# Patient Record
Sex: FEMALE | Race: WHITE | NOT HISPANIC OR LATINO | Employment: OTHER | URBAN - METROPOLITAN AREA
[De-identification: names, ages, dates, MRNs, and addresses within clinical notes are randomized per-mention and may not be internally consistent; named-entity substitution may affect disease eponyms.]

---

## 2018-06-29 ENCOUNTER — HOSPITAL ENCOUNTER (OUTPATIENT)
Dept: RADIOLOGY | Facility: MEDICAL CENTER | Age: 53
End: 2018-06-29

## 2018-07-05 ENCOUNTER — HOSPITAL ENCOUNTER (OUTPATIENT)
Dept: RADIOLOGY | Facility: MEDICAL CENTER | Age: 53
End: 2018-07-05
Attending: NURSE PRACTITIONER
Payer: MEDICARE

## 2018-07-05 DIAGNOSIS — Z12.39 SCREENING BREAST EXAMINATION: ICD-10-CM

## 2018-07-05 PROCEDURE — 77063 BREAST TOMOSYNTHESIS BI: CPT

## 2018-07-11 ENCOUNTER — HOSPITAL ENCOUNTER (OUTPATIENT)
Dept: LAB | Facility: MEDICAL CENTER | Age: 53
End: 2018-07-11
Attending: NURSE PRACTITIONER
Payer: MEDICARE

## 2018-07-11 LAB
ALBUMIN SERPL BCP-MCNC: 4.2 G/DL (ref 3.2–4.9)
ALBUMIN/GLOB SERPL: 2.1 G/DL
ALP SERPL-CCNC: 69 U/L (ref 30–99)
ALT SERPL-CCNC: 32 U/L (ref 2–50)
ANION GAP SERPL CALC-SCNC: 8 MMOL/L (ref 0–11.9)
APPEARANCE UR: CLEAR
AST SERPL-CCNC: 19 U/L (ref 12–45)
BACTERIA #/AREA URNS HPF: NEGATIVE /HPF
BASOPHILS # BLD AUTO: 0.9 % (ref 0–1.8)
BASOPHILS # BLD: 0.03 K/UL (ref 0–0.12)
BILIRUB SERPL-MCNC: 0.3 MG/DL (ref 0.1–1.5)
BILIRUB UR QL STRIP.AUTO: NEGATIVE
BUN SERPL-MCNC: 33 MG/DL (ref 8–22)
CALCIUM SERPL-MCNC: 9.2 MG/DL (ref 8.5–10.5)
CHLORIDE SERPL-SCNC: 110 MMOL/L (ref 96–112)
CHOLEST SERPL-MCNC: 190 MG/DL (ref 100–199)
CO2 SERPL-SCNC: 27 MMOL/L (ref 20–33)
COLOR UR: YELLOW
CREAT SERPL-MCNC: 1.15 MG/DL (ref 0.5–1.4)
EOSINOPHIL # BLD AUTO: 0.08 K/UL (ref 0–0.51)
EOSINOPHIL NFR BLD: 2.5 % (ref 0–6.9)
EPI CELLS #/AREA URNS HPF: ABNORMAL /HPF
ERYTHROCYTE [DISTWIDTH] IN BLOOD BY AUTOMATED COUNT: 40 FL (ref 35.9–50)
GLOBULIN SER CALC-MCNC: 2 G/DL (ref 1.9–3.5)
GLUCOSE SERPL-MCNC: 104 MG/DL (ref 65–99)
GLUCOSE UR STRIP.AUTO-MCNC: NEGATIVE MG/DL
HCT VFR BLD AUTO: 42.8 % (ref 37–47)
HDLC SERPL-MCNC: 61 MG/DL
HGB BLD-MCNC: 14.5 G/DL (ref 12–16)
HYALINE CASTS #/AREA URNS LPF: ABNORMAL /LPF
IMM GRANULOCYTES # BLD AUTO: 0 K/UL (ref 0–0.11)
IMM GRANULOCYTES NFR BLD AUTO: 0 % (ref 0–0.9)
KETONES UR STRIP.AUTO-MCNC: NEGATIVE MG/DL
LDLC SERPL CALC-MCNC: 116 MG/DL
LEUKOCYTE ESTERASE UR QL STRIP.AUTO: ABNORMAL
LYMPHOCYTES # BLD AUTO: 1.74 K/UL (ref 1–4.8)
LYMPHOCYTES NFR BLD: 55.1 % (ref 22–41)
MCH RBC QN AUTO: 32.5 PG (ref 27–33)
MCHC RBC AUTO-ENTMCNC: 33.9 G/DL (ref 33.6–35)
MCV RBC AUTO: 96 FL (ref 81.4–97.8)
MICRO URNS: ABNORMAL
MONOCYTES # BLD AUTO: 0.21 K/UL (ref 0–0.85)
MONOCYTES NFR BLD AUTO: 6.6 % (ref 0–13.4)
NEUTROPHILS # BLD AUTO: 1.1 K/UL (ref 2–7.15)
NEUTROPHILS NFR BLD: 34.9 % (ref 44–72)
NITRITE UR QL STRIP.AUTO: NEGATIVE
NRBC # BLD AUTO: 0 K/UL
NRBC BLD-RTO: 0 /100 WBC
PH UR STRIP.AUTO: 6.5 [PH]
PLATELET # BLD AUTO: 181 K/UL (ref 164–446)
PMV BLD AUTO: 9.5 FL (ref 9–12.9)
POTASSIUM SERPL-SCNC: 4.4 MMOL/L (ref 3.6–5.5)
PROT SERPL-MCNC: 6.2 G/DL (ref 6–8.2)
PROT UR QL STRIP: NEGATIVE MG/DL
RBC # BLD AUTO: 4.46 M/UL (ref 4.2–5.4)
RBC # URNS HPF: ABNORMAL /HPF
RBC UR QL AUTO: NEGATIVE
SODIUM SERPL-SCNC: 145 MMOL/L (ref 135–145)
SP GR UR STRIP.AUTO: 1.02
T4 FREE SERPL-MCNC: 0.84 NG/DL (ref 0.53–1.43)
TRIGL SERPL-MCNC: 64 MG/DL (ref 0–149)
TSH SERPL DL<=0.005 MIU/L-ACNC: 10.51 UIU/ML (ref 0.38–5.33)
UROBILINOGEN UR STRIP.AUTO-MCNC: 1 MG/DL
WBC # BLD AUTO: 3.2 K/UL (ref 4.8–10.8)
WBC #/AREA URNS HPF: ABNORMAL /HPF

## 2018-07-11 PROCEDURE — 80061 LIPID PANEL: CPT

## 2018-07-11 PROCEDURE — 87086 URINE CULTURE/COLONY COUNT: CPT

## 2018-07-11 PROCEDURE — 36415 COLL VENOUS BLD VENIPUNCTURE: CPT

## 2018-07-11 PROCEDURE — 85025 COMPLETE CBC W/AUTO DIFF WBC: CPT

## 2018-07-11 PROCEDURE — 84439 ASSAY OF FREE THYROXINE: CPT

## 2018-07-11 PROCEDURE — 80053 COMPREHEN METABOLIC PANEL: CPT

## 2018-07-11 PROCEDURE — 81001 URINALYSIS AUTO W/SCOPE: CPT

## 2018-07-11 PROCEDURE — 84443 ASSAY THYROID STIM HORMONE: CPT

## 2018-07-13 LAB
BACTERIA UR CULT: NORMAL
SIGNIFICANT IND 70042: NORMAL
SITE SITE: NORMAL
SOURCE SOURCE: NORMAL

## 2018-10-10 ENCOUNTER — HOSPITAL ENCOUNTER (OUTPATIENT)
Dept: LAB | Facility: MEDICAL CENTER | Age: 53
End: 2018-10-10
Attending: NURSE PRACTITIONER
Payer: MEDICARE

## 2018-10-10 LAB
T4 FREE SERPL-MCNC: 0.89 NG/DL (ref 0.53–1.43)
THYROPEROXIDASE AB SERPL-ACNC: 0.5 IU/ML (ref 0–9)
TSH SERPL DL<=0.005 MIU/L-ACNC: 3.69 UIU/ML (ref 0.38–5.33)

## 2018-10-10 PROCEDURE — 86376 MICROSOMAL ANTIBODY EACH: CPT

## 2018-10-10 PROCEDURE — 36415 COLL VENOUS BLD VENIPUNCTURE: CPT

## 2018-10-10 PROCEDURE — 84439 ASSAY OF FREE THYROXINE: CPT

## 2018-10-10 PROCEDURE — 84443 ASSAY THYROID STIM HORMONE: CPT

## 2019-06-15 ENCOUNTER — HOSPITAL ENCOUNTER (EMERGENCY)
Facility: MEDICAL CENTER | Age: 54
End: 2019-06-15
Attending: EMERGENCY MEDICINE
Payer: MEDICARE

## 2019-06-15 ENCOUNTER — APPOINTMENT (OUTPATIENT)
Dept: RADIOLOGY | Facility: MEDICAL CENTER | Age: 54
End: 2019-06-15
Attending: EMERGENCY MEDICINE
Payer: MEDICARE

## 2019-06-15 VITALS
WEIGHT: 134.7 LBS | HEIGHT: 62 IN | RESPIRATION RATE: 16 BRPM | BODY MASS INDEX: 24.79 KG/M2 | TEMPERATURE: 97.1 F | SYSTOLIC BLOOD PRESSURE: 137 MMHG | HEART RATE: 82 BPM | OXYGEN SATURATION: 95 % | DIASTOLIC BLOOD PRESSURE: 93 MMHG

## 2019-06-15 DIAGNOSIS — N20.1 URETEROLITHIASIS: ICD-10-CM

## 2019-06-15 DIAGNOSIS — R79.89 ELEVATED SERUM CREATININE: ICD-10-CM

## 2019-06-15 LAB
ALBUMIN SERPL BCP-MCNC: 4.3 G/DL (ref 3.2–4.9)
ALBUMIN/GLOB SERPL: 1.5 G/DL
ALP SERPL-CCNC: 64 U/L (ref 30–99)
ALT SERPL-CCNC: 9 U/L (ref 2–50)
ANION GAP SERPL CALC-SCNC: 9 MMOL/L (ref 0–11.9)
APPEARANCE UR: CLEAR
AST SERPL-CCNC: 12 U/L (ref 12–45)
BASOPHILS # BLD AUTO: 0.6 % (ref 0–1.8)
BASOPHILS # BLD: 0.04 K/UL (ref 0–0.12)
BILIRUB SERPL-MCNC: 0.5 MG/DL (ref 0.1–1.5)
BILIRUB UR QL STRIP.AUTO: NEGATIVE
BUN SERPL-MCNC: 27 MG/DL (ref 8–22)
CALCIUM SERPL-MCNC: 9.5 MG/DL (ref 8.5–10.5)
CHLORIDE SERPL-SCNC: 106 MMOL/L (ref 96–112)
CO2 SERPL-SCNC: 28 MMOL/L (ref 20–33)
COLOR UR: YELLOW
CREAT SERPL-MCNC: 1.28 MG/DL (ref 0.5–1.4)
EOSINOPHIL # BLD AUTO: 0.05 K/UL (ref 0–0.51)
EOSINOPHIL NFR BLD: 0.8 % (ref 0–6.9)
ERYTHROCYTE [DISTWIDTH] IN BLOOD BY AUTOMATED COUNT: 38.2 FL (ref 35.9–50)
GLOBULIN SER CALC-MCNC: 2.9 G/DL (ref 1.9–3.5)
GLUCOSE SERPL-MCNC: 132 MG/DL (ref 65–99)
GLUCOSE UR STRIP.AUTO-MCNC: NEGATIVE MG/DL
HCG SERPL QL: NEGATIVE
HCT VFR BLD AUTO: 48 % (ref 37–47)
HGB BLD-MCNC: 15.7 G/DL (ref 12–16)
IMM GRANULOCYTES # BLD AUTO: 0.02 K/UL (ref 0–0.11)
IMM GRANULOCYTES NFR BLD AUTO: 0.3 % (ref 0–0.9)
KETONES UR STRIP.AUTO-MCNC: ABNORMAL MG/DL
LACTATE BLD-SCNC: 1 MMOL/L (ref 0.5–2)
LEUKOCYTE ESTERASE UR QL STRIP.AUTO: NEGATIVE
LIPASE SERPL-CCNC: <3 U/L (ref 11–82)
LYMPHOCYTES # BLD AUTO: 1.48 K/UL (ref 1–4.8)
LYMPHOCYTES NFR BLD: 22.9 % (ref 22–41)
MCH RBC QN AUTO: 30.5 PG (ref 27–33)
MCHC RBC AUTO-ENTMCNC: 32.7 G/DL (ref 33.6–35)
MCV RBC AUTO: 93.2 FL (ref 81.4–97.8)
MICRO URNS: ABNORMAL
MONOCYTES # BLD AUTO: 0.32 K/UL (ref 0–0.85)
MONOCYTES NFR BLD AUTO: 4.9 % (ref 0–13.4)
NEUTROPHILS # BLD AUTO: 4.56 K/UL (ref 2–7.15)
NEUTROPHILS NFR BLD: 70.5 % (ref 44–72)
NITRITE UR QL STRIP.AUTO: NEGATIVE
NRBC # BLD AUTO: 0 K/UL
NRBC BLD-RTO: 0 /100 WBC
PH UR STRIP.AUTO: 5 [PH]
PLATELET # BLD AUTO: 215 K/UL (ref 164–446)
PMV BLD AUTO: 8.8 FL (ref 9–12.9)
POTASSIUM SERPL-SCNC: 4.2 MMOL/L (ref 3.6–5.5)
PROT SERPL-MCNC: 7.2 G/DL (ref 6–8.2)
PROT UR QL STRIP: NEGATIVE MG/DL
RBC # BLD AUTO: 5.15 M/UL (ref 4.2–5.4)
RBC UR QL AUTO: NEGATIVE
SODIUM SERPL-SCNC: 143 MMOL/L (ref 135–145)
SP GR UR STRIP.AUTO: 1.03
UROBILINOGEN UR STRIP.AUTO-MCNC: 1 MG/DL
WBC # BLD AUTO: 6.5 K/UL (ref 4.8–10.8)

## 2019-06-15 PROCEDURE — 81003 URINALYSIS AUTO W/O SCOPE: CPT

## 2019-06-15 PROCEDURE — 99284 EMERGENCY DEPT VISIT MOD MDM: CPT

## 2019-06-15 PROCEDURE — 83690 ASSAY OF LIPASE: CPT

## 2019-06-15 PROCEDURE — 80053 COMPREHEN METABOLIC PANEL: CPT

## 2019-06-15 PROCEDURE — 93005 ELECTROCARDIOGRAM TRACING: CPT | Performed by: EMERGENCY MEDICINE

## 2019-06-15 PROCEDURE — 84703 CHORIONIC GONADOTROPIN ASSAY: CPT

## 2019-06-15 PROCEDURE — 36415 COLL VENOUS BLD VENIPUNCTURE: CPT

## 2019-06-15 PROCEDURE — 96374 THER/PROPH/DIAG INJ IV PUSH: CPT

## 2019-06-15 PROCEDURE — 700117 HCHG RX CONTRAST REV CODE 255: Performed by: EMERGENCY MEDICINE

## 2019-06-15 PROCEDURE — 74177 CT ABD & PELVIS W/CONTRAST: CPT

## 2019-06-15 PROCEDURE — 83605 ASSAY OF LACTIC ACID: CPT

## 2019-06-15 PROCEDURE — 85025 COMPLETE CBC W/AUTO DIFF WBC: CPT

## 2019-06-15 PROCEDURE — 700111 HCHG RX REV CODE 636 W/ 250 OVERRIDE (IP): Performed by: EMERGENCY MEDICINE

## 2019-06-15 RX ORDER — HYDROMORPHONE HYDROCHLORIDE 1 MG/ML
1 INJECTION, SOLUTION INTRAMUSCULAR; INTRAVENOUS; SUBCUTANEOUS ONCE
Status: COMPLETED | OUTPATIENT
Start: 2019-06-15 | End: 2019-06-15

## 2019-06-15 RX ADMIN — IOHEXOL 90 ML: 350 INJECTION, SOLUTION INTRAVENOUS at 19:43

## 2019-06-15 RX ADMIN — IOHEXOL 25 ML: 240 INJECTION, SOLUTION INTRATHECAL; INTRAVASCULAR; INTRAVENOUS; ORAL at 19:43

## 2019-06-15 RX ADMIN — HYDROMORPHONE HYDROCHLORIDE 1 MG: 1 INJECTION, SOLUTION INTRAMUSCULAR; INTRAVENOUS; SUBCUTANEOUS at 17:45

## 2019-06-15 ASSESSMENT — LIFESTYLE VARIABLES: DO YOU DRINK ALCOHOL: NO

## 2019-06-15 NOTE — ED TRIAGE NOTES
Ambulates to triage  Chief Complaint   Patient presents with   • RLQ Pain     Pain came on suddenly about 1 hour ago, pain is constant, it started after drinking and iced tea.  Denies any n/v/d.

## 2019-06-16 LAB — EKG IMPRESSION: NORMAL

## 2019-06-16 NOTE — ED PROVIDER NOTES
ED Provider Note    Scribed for Denny Bruno M.D. by Camila Ma. 6/15/2019  5:20 PM    Primary care provider: Rhonda Ramirez  Means of arrival: Walk-In  History obtained from: Patient  History limited by: None    CHIEF COMPLAINT  Chief Complaint   Patient presents with   • RLQ Pain       HPI  Jemima Piña is a 53 y.o. female who presents to the Emergency Department for mild burning/aching right lower quadrant abdominal pain sudden onset at 1:30PM today. The pain radiates to her right lower back. She denies hematuria, nausea, vomiting, or diarrhea. She has never experienced this pain before. She has an abdominal surgical history of cholecystectomy.     REVIEW OF SYSTEMS  Pertinent positives include: right lower quadrant abdominal pain radiating to her right lower back. Pertinent negatives include: hematuria, nausea, vomiting, or diarrhea. See history of present illness. All other systems are negative.     PAST MEDICAL HISTORY   has a past medical history of Anxiety state, unspecified; Bipolar I disorder, most recent episode (or current) depressed, unspecified; Breast cancer (HCC) (2010); Chronic fatigue syndrome; CKD (chronic kidney disease) stage 2, GFR 60-89 ml/min; Congenital hypertrophic pyloric stenosis; Depression; Edema; Endometriosis, site unspecified; Hemorrhage of rectum and anus; Myalgia and myositis, unspecified; Other and unspecified ovarian cyst; Peptic ulcer, unspecified site, unspecified as acute or chronic, without mention of hemorrhage, perforation, or obstruction; Temporomandibular joint disorders, unspecified; Tobacco use disorder; Unspecified hypothyroidism; and Vaginal candidiasis (10/9/12).    SURGICAL HISTORY   has a past surgical history that includes tonsillectomy (1981); cholecystectomy (1991); thyroid lobectomy (1999); arthroscopy, knee; tubal coagulation laparoscopic bilateral (1994); and lumpectomy.    SOCIAL HISTORY  Social History   Substance Use Topics   • Smoking status:  "Former Smoker     Types: Cigarettes     Quit date: 11/1/2016   • Smokeless tobacco: Never Used      Comment: expect with her emotional disease that she cannot quit at this time   • Alcohol use No      History   Drug Use No       FAMILY HISTORY  Family History   Problem Relation Age of Onset   • Other Mother         bipolar, constipation   • Psychiatry Mother    • Clotting Disorder Father         had blood clot remotely   • Other Sister         suicide   • Psychiatry Sister    • Cancer Maternal Grandfather         prostate   • Cancer Paternal Grandfather         prostate   • Asthma Child    • Lung Disease Child        CURRENT MEDICATIONS  Current Outpatient Prescriptions:   •  Levomilnacipran HCl ER (FETZIMA) 80 MG CP24, Take 80 mg by mouth every day., Disp: 30 Cap, Rfl: 0  •  gabapentin (NEURONTIN) 300 MG CAPS, Take three tablets orally daily., Disp: 180 Cap, Rfl: 0  •  carisoprodol (SOMA) 350 MG TABS, TAKE ONE TABLET BY MOUTH TWICE DAILY, Disp: 60 Tab, Rfl: 5  •  eletriptan (RELPAX) 40 MG tablet, Take 1 Tab by mouth Once PRN. may repeat in 2 hours if necessary for migraine., Disp: 10 Tab, Rfl: 3  •  FLECTOR 1.3 % PTCH, APPLY 1 PATCH TO SKIN AS DIRECTED 2 TIMES A DAY, Disp: 60 Patch, Rfl: 0  •  B-D INS SYRINGE 0.5CC/30GX1/2\" 30G X 1/2\" 0.5 ML MISC, USE AS DIRECTED, Disp: 100 Each, Rfl: 11  •  Asenapine Maleate (SAPHRIS) 10 MG SUBL, Place 10 Tabs under tongue 2 Times a Day., Disp: 60 Each, Rfl: 2  •  zolpidem (AMBIEN) 10 MG TABS, Take 2 Tabs by mouth at bedtime as needed for Sleep., Disp: 60 Each, Rfl: 2  •  chlorproMAZINE (THORAZINE) 100 MG tablet, Take 2 Tabs by mouth every bedtime., Disp: 60 Tab, Rfl: 2  •  clonazepam (KLONOPIN) 1 MG TABS, Take 0.5 Tabs by mouth 2 times a day., Disp: 60 Tab, Rfl: 2  •  furosemide (LASIX) 20 MG TABS, Take 1 Tab by mouth every 48 hours as needed., Disp: 10 Tab, Rfl: 5  •  thyroid (ARMOUR THYROID) 30 MG TABS, Take 1 Tab by mouth every day., Disp: , Rfl:   •  cyanocobalamin (VITAMIN " "B-12) 1000 MCG/ML SOLN, INJECT 1ML IM ONCE A WEEK, Disp: 6 mL, Rfl: 5  •  promethazine (PHENERGAN) 50 MG TABS, TAKE 1 TABLET BY MOUTH EVERY 6 HOURS AS NEEDED FOR NAUSEA/VOMITING., Disp: 30 Tab, Rfl: 4  •  tamoxifen (NOLVADEX) 20 MG tablet, Take 20 mg by mouth every day., Disp: , Rfl:   •  ranitidine (ZANTAC) 150 MG TABS, Take 150 mg by mouth See Admin Instructions. 1 tab daily prn stomach upset. , Disp: , Rfl:     ALLERGIES  Allergies   Allergen Reactions   • Aminophylline      Original entry read AMNOPHYLIN      • Asa [Aspirin]    • Cyclacillin      Original entry read cyclines   • Flu Virus Vaccine    • Pcn [Penicillins]    • Reglan [Metoclopramide Hcl]    • Trazodone      Increased anxiety       PHYSICAL EXAM  VITAL SIGNS: /93   Pulse 88   Temp 36.2 °C (97.1 °F) (Temporal)   Resp 16   Ht 1.575 m (5' 2\")   Wt 61.1 kg (134 lb 11.2 oz)   SpO2 92%   BMI 24.64 kg/m²     Constitutional: Alert in no apparent distress.  HENT: No signs of trauma, Bilateral external ears normal, Nose normal. Uvula midline.   Eyes: Pupils are equal and reactive, Conjunctiva normal, Non-icteric.   Neck: Normal range of motion, No tenderness, Supple, No stridor.   Lymphatic: No lymphadenopathy noted.   Cardiovascular: Regular rate and rhythm, no murmurs.   Thorax & Lungs: Normal breath sounds, No respiratory distress, No wheezing, No chest tenderness.   Abdomen:   Soft, No tenderness, No peritoneal signs, No masses, No pulsatile masses.   Skin: Warm, Dry, No erythema, No rash.   Back: No bony tenderness, No CVA tenderness.   Extremities: Intact distal pulses, No edema, No tenderness, No cyanosis.  Musculoskeletal: Good range of motion in all major joints. No tenderness to palpation or major deformities noted.   Neurologic: Alert , Normal motor function, Normal sensory function, No focal deficits noted.   Psychiatric: Affect normal, Judgment normal, Mood normal.       DIAGNOSTIC STUDIES / PROCEDURES    LABS  Labs Reviewed   CBC " WITH DIFFERENTIAL - Abnormal; Notable for the following:        Result Value    Hematocrit 48.0 (*)     MCHC 32.7 (*)     MPV 8.8 (*)     All other components within normal limits   COMP METABOLIC PANEL - Abnormal; Notable for the following:     Glucose 132 (*)     Bun 27 (*)     All other components within normal limits   LIPASE - Abnormal; Notable for the following:     Lipase <3 (*)     All other components within normal limits   URINALYSIS,CULTURE IF INDICATED - Abnormal; Notable for the following:     Ketones Trace (*)     All other components within normal limits   ESTIMATED GFR - Abnormal; Notable for the following:     GFR If  53 (*)     GFR If Non  44 (*)     All other components within normal limits   HCG QUAL SERUM   LACTIC ACID      All labs reviewed by me.      RADIOLOGY  CT-ABDOMEN-PELVIS WITH   Final Result      Mild right hydroureteronephrosis. The distal right ureter is not well delineated but there is a 2.8 mm calcific density in the right pelvis which might be at the right UVJ.      Perinephric stranding is seen. Correlation with urinalysis is recommended to exclude superimposed infection.      Interna and extrahepatic biliary ductal prominence may represent ectasia in the setting of prior cholecystectomy. Correlation with LFTs is recommended.      Moderate amount of colonic stool.      No evidence of acute appendicitis.           The radiologist's interpretation of all radiological studies have been reviewed by me.    COURSE & MEDICAL DECISION MAKING  Nursing notes, VS, PMSFHx reviewed in chart.    53 y.o. female p/w chief complaint of right lower quadrant abdominal pain.    The differential diagnoses include but are not limited to:     #abdominal pain    No LLQ pain or TTP or fever to suggest diverticulitis  No pain at of proportion to suggest mesenteric ischemia  No e/o rash or zoster      CT scan of abdomen ordered in order to rule out appy vs stone    No e/o  UTI  No upper abd pain or elevation in lipase to suggest pancreatitis    Patient's history and physical exam consistent with ureterolithiasis.  No obvious infected stone.  I discussed results with patient and she agrees to follow-up with urologist and primary care physician within the next week.    5:20 PM Patient seen and examined at bedside. Patient will be treated with Dilaudid 1 mg for her pain. Ordered CT-Abdomen, EKG, Beta-HCG Qual, Lactic Acid, Estimated GFR, CBC w/ differential, CMP, Lipase, UA with culture to evaluate her symptoms.     7:42 PM - I have ordered Omnipaque 350 mg/mL and 240 mg/mL.    8:25 PM - I reevaluated the patient at bedside and updated her on her evaluation results, as outlined above. I discussed my plan for discharge with urology follow up. The patient is understanding and agreeable.   The patient will return for new or worsening symptoms and is stable at the time of discharge.    The patient is referred to a primary physician for blood pressure management, diabetic screening, and for all other preventative health concerns.    DISPOSITION:  Patient will be discharged home in stable condition.    FOLLOW UP:  Rhonda Ramirez  [address] [address]  [phone]      Please call and schedule follow-up appointment for repeat blood tests within the next week to have your kidney function rechecked and discuss your kidney stone.    Candido Patterson M.D.  [address] [facility] [address]  [phone]    In 3 days  Please call to schedule urology follow-up appointment later this week.      FINAL IMPRESSION  1. Ureterolithiasis    2. Elevated serum creatinine          Camila MOONEY ([place]), am scribing for, and in the presence of, Denny Bruno M.D..    Electronically signed by: Camila Ma ([place]), 6/15/2019    IDenny M.D. personally performed the services described in this documentation, as scribed by Camila Ma in my presence, and it is both accurate  and complete.    The note accurately reflects work and decisions made by me.  Denny Bruno  6/16/2019  12:51 AM

## 2019-06-16 NOTE — ED NOTES
Assumed care of pt, Pt resting on cart, no s/s of distress. Vitals stable, call light within reach, cart low and locked. Pt finished with oral contrast, awaiting CT

## 2019-06-16 NOTE — DISCHARGE INSTRUCTIONS
Please discuss your CT scan findings with your urologist and regular doctor:  Mild right hydroureteronephrosis. The distal right ureter is not well delineated but there is a 2.8 mm calcific density in the right pelvis which might be at the right UVJ.    Perinephric stranding is seen. Correlation with urinalysis is recommended to exclude superimposed infection.    Interna and extrahepatic biliary ductal prominence may represent ectasia in the setting of prior cholecystectomy. Correlation with LFTs is recommended.    Moderate amount of colonic stool.    No evidence of acute appendicitis.

## 2019-06-16 NOTE — ED NOTES
All lines, monitors DC'd. Discharge instructions given. Pt given time to ask any questions. Pt ambulatory out of department. All pt belongings in pt's possession. Pt verbalized understanding.

## 2019-07-09 ENCOUNTER — HOSPITAL ENCOUNTER (OUTPATIENT)
Dept: RADIOLOGY | Facility: MEDICAL CENTER | Age: 54
End: 2019-07-09
Attending: NURSE PRACTITIONER
Payer: MEDICARE

## 2019-07-09 DIAGNOSIS — Z12.39 SCREENING BREAST EXAMINATION: ICD-10-CM

## 2019-07-09 PROCEDURE — 77063 BREAST TOMOSYNTHESIS BI: CPT

## 2019-07-24 ENCOUNTER — HOSPITAL ENCOUNTER (OUTPATIENT)
Dept: LAB | Facility: MEDICAL CENTER | Age: 54
End: 2019-07-24
Attending: NURSE PRACTITIONER
Payer: MEDICARE

## 2019-07-24 LAB
ALBUMIN SERPL BCP-MCNC: 4 G/DL (ref 3.2–4.9)
ALBUMIN/GLOB SERPL: 1.4 G/DL
ALP SERPL-CCNC: 56 U/L (ref 30–99)
ALT SERPL-CCNC: 10 U/L (ref 2–50)
ANION GAP SERPL CALC-SCNC: 8 MMOL/L (ref 0–11.9)
APPEARANCE UR: CLEAR
AST SERPL-CCNC: 15 U/L (ref 12–45)
BASOPHILS # BLD AUTO: 0.3 % (ref 0–1.8)
BASOPHILS # BLD: 0.01 K/UL (ref 0–0.12)
BILIRUB SERPL-MCNC: 0.4 MG/DL (ref 0.1–1.5)
BILIRUB UR QL STRIP.AUTO: NEGATIVE
BUN SERPL-MCNC: 20 MG/DL (ref 8–22)
CALCIUM SERPL-MCNC: 9.5 MG/DL (ref 8.5–10.5)
CHLORIDE SERPL-SCNC: 107 MMOL/L (ref 96–112)
CHOLEST SERPL-MCNC: 219 MG/DL (ref 100–199)
CO2 SERPL-SCNC: 28 MMOL/L (ref 20–33)
COLOR UR: YELLOW
CREAT SERPL-MCNC: 1.25 MG/DL (ref 0.5–1.4)
EOSINOPHIL # BLD AUTO: 0.08 K/UL (ref 0–0.51)
EOSINOPHIL NFR BLD: 2.2 % (ref 0–6.9)
ERYTHROCYTE [DISTWIDTH] IN BLOOD BY AUTOMATED COUNT: 39.7 FL (ref 35.9–50)
GLOBULIN SER CALC-MCNC: 2.9 G/DL (ref 1.9–3.5)
GLUCOSE SERPL-MCNC: 118 MG/DL (ref 65–99)
GLUCOSE UR STRIP.AUTO-MCNC: NEGATIVE MG/DL
HCT VFR BLD AUTO: 47.2 % (ref 37–47)
HDLC SERPL-MCNC: 55 MG/DL
HGB BLD-MCNC: 16 G/DL (ref 12–16)
IMM GRANULOCYTES # BLD AUTO: 0 K/UL (ref 0–0.11)
IMM GRANULOCYTES NFR BLD AUTO: 0 % (ref 0–0.9)
KETONES UR STRIP.AUTO-MCNC: NEGATIVE MG/DL
LDLC SERPL CALC-MCNC: 143 MG/DL
LEUKOCYTE ESTERASE UR QL STRIP.AUTO: NEGATIVE
LYMPHOCYTES # BLD AUTO: 1.57 K/UL (ref 1–4.8)
LYMPHOCYTES NFR BLD: 42.4 % (ref 22–41)
MCH RBC QN AUTO: 31.7 PG (ref 27–33)
MCHC RBC AUTO-ENTMCNC: 33.9 G/DL (ref 33.6–35)
MCV RBC AUTO: 93.5 FL (ref 81.4–97.8)
MICRO URNS: NORMAL
MONOCYTES # BLD AUTO: 0.24 K/UL (ref 0–0.85)
MONOCYTES NFR BLD AUTO: 6.5 % (ref 0–13.4)
NEUTROPHILS # BLD AUTO: 1.8 K/UL (ref 2–7.15)
NEUTROPHILS NFR BLD: 48.6 % (ref 44–72)
NITRITE UR QL STRIP.AUTO: NEGATIVE
NRBC # BLD AUTO: 0 K/UL
NRBC BLD-RTO: 0 /100 WBC
PH UR STRIP.AUTO: 7.5 [PH]
PLATELET # BLD AUTO: 196 K/UL (ref 164–446)
PMV BLD AUTO: 9.6 FL (ref 9–12.9)
POTASSIUM SERPL-SCNC: 4.1 MMOL/L (ref 3.6–5.5)
PROT SERPL-MCNC: 6.9 G/DL (ref 6–8.2)
PROT UR QL STRIP: NEGATIVE MG/DL
RBC # BLD AUTO: 5.05 M/UL (ref 4.2–5.4)
RBC UR QL AUTO: NEGATIVE
SODIUM SERPL-SCNC: 143 MMOL/L (ref 135–145)
SP GR UR STRIP.AUTO: 1.01
T4 FREE SERPL-MCNC: 0.76 NG/DL (ref 0.53–1.43)
TRIGL SERPL-MCNC: 106 MG/DL (ref 0–149)
TSH SERPL DL<=0.005 MIU/L-ACNC: 2.67 UIU/ML (ref 0.38–5.33)
UROBILINOGEN UR STRIP.AUTO-MCNC: 1 MG/DL
WBC # BLD AUTO: 3.7 K/UL (ref 4.8–10.8)

## 2019-07-24 PROCEDURE — 81003 URINALYSIS AUTO W/O SCOPE: CPT

## 2019-07-24 PROCEDURE — 80053 COMPREHEN METABOLIC PANEL: CPT

## 2019-07-24 PROCEDURE — 36415 COLL VENOUS BLD VENIPUNCTURE: CPT

## 2019-07-24 PROCEDURE — 85025 COMPLETE CBC W/AUTO DIFF WBC: CPT

## 2019-07-24 PROCEDURE — 84439 ASSAY OF FREE THYROXINE: CPT

## 2019-07-24 PROCEDURE — 84443 ASSAY THYROID STIM HORMONE: CPT

## 2019-07-24 PROCEDURE — 80061 LIPID PANEL: CPT

## 2019-11-24 ENCOUNTER — HOSPITAL ENCOUNTER (OUTPATIENT)
Facility: MEDICAL CENTER | Age: 54
End: 2019-11-24
Payer: MEDICARE

## 2019-11-24 PROCEDURE — 82274 ASSAY TEST FOR BLOOD FECAL: CPT

## 2019-11-27 ENCOUNTER — HOSPITAL ENCOUNTER (OUTPATIENT)
Facility: MEDICAL CENTER | Age: 54
End: 2019-11-27
Payer: MEDICARE

## 2019-12-05 LAB — HEMOCCULT STL QL IA: NEGATIVE

## 2020-01-17 ENCOUNTER — HOSPITAL ENCOUNTER (OUTPATIENT)
Dept: LAB | Facility: MEDICAL CENTER | Age: 55
End: 2020-01-17
Attending: NURSE PRACTITIONER
Payer: MEDICARE

## 2020-01-17 LAB
ALBUMIN SERPL BCP-MCNC: 3.8 G/DL (ref 3.2–4.9)
ALBUMIN/GLOB SERPL: 1.5 G/DL
ALP SERPL-CCNC: 79 U/L (ref 30–99)
ALT SERPL-CCNC: 14 U/L (ref 2–50)
ANION GAP SERPL CALC-SCNC: 7 MMOL/L (ref 0–11.9)
AST SERPL-CCNC: 16 U/L (ref 12–45)
BASOPHILS # BLD AUTO: 0.6 % (ref 0–1.8)
BASOPHILS # BLD: 0.02 K/UL (ref 0–0.12)
BILIRUB SERPL-MCNC: 0.4 MG/DL (ref 0.1–1.5)
BUN SERPL-MCNC: 25 MG/DL (ref 8–22)
CALCIUM SERPL-MCNC: 9.4 MG/DL (ref 8.5–10.5)
CHLORIDE SERPL-SCNC: 106 MMOL/L (ref 96–112)
CHOLEST SERPL-MCNC: 185 MG/DL (ref 100–199)
CO2 SERPL-SCNC: 30 MMOL/L (ref 20–33)
CREAT SERPL-MCNC: 1 MG/DL (ref 0.5–1.4)
EOSINOPHIL # BLD AUTO: 0.06 K/UL (ref 0–0.51)
EOSINOPHIL NFR BLD: 1.8 % (ref 0–6.9)
ERYTHROCYTE [DISTWIDTH] IN BLOOD BY AUTOMATED COUNT: 37.2 FL (ref 35.9–50)
EST. AVERAGE GLUCOSE BLD GHB EST-MCNC: 105 MG/DL
FASTING STATUS PATIENT QL REPORTED: NORMAL
GLOBULIN SER CALC-MCNC: 2.6 G/DL (ref 1.9–3.5)
GLUCOSE SERPL-MCNC: 93 MG/DL (ref 65–99)
HBA1C MFR BLD: 5.3 % (ref 0–5.6)
HCT VFR BLD AUTO: 45.6 % (ref 37–47)
HDLC SERPL-MCNC: 55 MG/DL
HGB BLD-MCNC: 15.5 G/DL (ref 12–16)
IMM GRANULOCYTES # BLD AUTO: 0.01 K/UL (ref 0–0.11)
IMM GRANULOCYTES NFR BLD AUTO: 0.3 % (ref 0–0.9)
LDLC SERPL CALC-MCNC: 110 MG/DL
LYMPHOCYTES # BLD AUTO: 1.75 K/UL (ref 1–4.8)
LYMPHOCYTES NFR BLD: 53.5 % (ref 22–41)
MCH RBC QN AUTO: 30.7 PG (ref 27–33)
MCHC RBC AUTO-ENTMCNC: 34 G/DL (ref 33.6–35)
MCV RBC AUTO: 90.3 FL (ref 81.4–97.8)
MONOCYTES # BLD AUTO: 0.25 K/UL (ref 0–0.85)
MONOCYTES NFR BLD AUTO: 7.6 % (ref 0–13.4)
NEUTROPHILS # BLD AUTO: 1.18 K/UL (ref 2–7.15)
NEUTROPHILS NFR BLD: 36.2 % (ref 44–72)
NRBC # BLD AUTO: 0 K/UL
NRBC BLD-RTO: 0 /100 WBC
PLATELET # BLD AUTO: 188 K/UL (ref 164–446)
PMV BLD AUTO: 9.1 FL (ref 9–12.9)
POTASSIUM SERPL-SCNC: 4.3 MMOL/L (ref 3.6–5.5)
PROT SERPL-MCNC: 6.4 G/DL (ref 6–8.2)
RBC # BLD AUTO: 5.05 M/UL (ref 4.2–5.4)
SODIUM SERPL-SCNC: 143 MMOL/L (ref 135–145)
TRIGL SERPL-MCNC: 102 MG/DL (ref 0–149)
TSH SERPL DL<=0.005 MIU/L-ACNC: 0.02 UIU/ML (ref 0.38–5.33)
WBC # BLD AUTO: 3.3 K/UL (ref 4.8–10.8)

## 2020-01-17 PROCEDURE — 85025 COMPLETE CBC W/AUTO DIFF WBC: CPT

## 2020-01-17 PROCEDURE — 80053 COMPREHEN METABOLIC PANEL: CPT

## 2020-01-17 PROCEDURE — 84443 ASSAY THYROID STIM HORMONE: CPT

## 2020-01-17 PROCEDURE — 36415 COLL VENOUS BLD VENIPUNCTURE: CPT

## 2020-01-17 PROCEDURE — 83036 HEMOGLOBIN GLYCOSYLATED A1C: CPT

## 2020-01-17 PROCEDURE — 80061 LIPID PANEL: CPT

## 2020-03-19 ENCOUNTER — HOSPITAL ENCOUNTER (OUTPATIENT)
Dept: LAB | Facility: MEDICAL CENTER | Age: 55
End: 2020-03-19
Attending: NURSE PRACTITIONER
Payer: MEDICARE

## 2020-03-19 LAB
BASOPHILS # BLD AUTO: 1.1 % (ref 0–1.8)
BASOPHILS # BLD: 0.03 K/UL (ref 0–0.12)
EOSINOPHIL # BLD AUTO: 0.08 K/UL (ref 0–0.51)
EOSINOPHIL NFR BLD: 2.9 % (ref 0–6.9)
ERYTHROCYTE [DISTWIDTH] IN BLOOD BY AUTOMATED COUNT: 40.6 FL (ref 35.9–50)
HCT VFR BLD AUTO: 45.9 % (ref 37–47)
HGB BLD-MCNC: 15 G/DL (ref 12–16)
IMM GRANULOCYTES # BLD AUTO: 0 K/UL (ref 0–0.11)
IMM GRANULOCYTES NFR BLD AUTO: 0 % (ref 0–0.9)
LYMPHOCYTES # BLD AUTO: 1.53 K/UL (ref 1–4.8)
LYMPHOCYTES NFR BLD: 56 % (ref 22–41)
MCH RBC QN AUTO: 30.7 PG (ref 27–33)
MCHC RBC AUTO-ENTMCNC: 32.7 G/DL (ref 33.6–35)
MCV RBC AUTO: 93.9 FL (ref 81.4–97.8)
MONOCYTES # BLD AUTO: 0.23 K/UL (ref 0–0.85)
MONOCYTES NFR BLD AUTO: 8.4 % (ref 0–13.4)
NEUTROPHILS # BLD AUTO: 0.86 K/UL (ref 2–7.15)
NEUTROPHILS NFR BLD: 31.6 % (ref 44–72)
NRBC # BLD AUTO: 0 K/UL
NRBC BLD-RTO: 0 /100 WBC
PLATELET # BLD AUTO: 164 K/UL (ref 164–446)
PMV BLD AUTO: 9.3 FL (ref 9–12.9)
RBC # BLD AUTO: 4.89 M/UL (ref 4.2–5.4)
TSH SERPL DL<=0.005 MIU/L-ACNC: 7.1 UIU/ML (ref 0.38–5.33)
WBC # BLD AUTO: 2.7 K/UL (ref 4.8–10.8)

## 2020-03-19 PROCEDURE — 85025 COMPLETE CBC W/AUTO DIFF WBC: CPT

## 2020-03-19 PROCEDURE — 84443 ASSAY THYROID STIM HORMONE: CPT

## 2020-03-19 PROCEDURE — 36415 COLL VENOUS BLD VENIPUNCTURE: CPT

## 2020-07-17 ENCOUNTER — HOSPITAL ENCOUNTER (OUTPATIENT)
Dept: RADIOLOGY | Facility: MEDICAL CENTER | Age: 55
End: 2020-07-17
Attending: NURSE PRACTITIONER
Payer: MEDICARE

## 2020-07-17 DIAGNOSIS — Z12.31 VISIT FOR SCREENING MAMMOGRAM: ICD-10-CM

## 2020-07-17 PROCEDURE — 77067 SCR MAMMO BI INCL CAD: CPT

## 2020-12-04 ENCOUNTER — HOSPITAL ENCOUNTER (OUTPATIENT)
Dept: LAB | Facility: MEDICAL CENTER | Age: 55
End: 2020-12-04
Attending: STUDENT IN AN ORGANIZED HEALTH CARE EDUCATION/TRAINING PROGRAM
Payer: MEDICARE

## 2020-12-04 PROCEDURE — U0003 INFECTIOUS AGENT DETECTION BY NUCLEIC ACID (DNA OR RNA); SEVERE ACUTE RESPIRATORY SYNDROME CORONAVIRUS 2 (SARS-COV-2) (CORONAVIRUS DISEASE [COVID-19]), AMPLIFIED PROBE TECHNIQUE, MAKING USE OF HIGH THROUGHPUT TECHNOLOGIES AS DESCRIBED BY CMS-2020-01-R: HCPCS

## 2020-12-05 LAB
COVID ORDER STATUS COVID19: NORMAL
SARS-COV-2 RNA RESP QL NAA+PROBE: DETECTED
SPECIMEN SOURCE: ABNORMAL

## 2021-01-08 ENCOUNTER — APPOINTMENT (OUTPATIENT)
Dept: LAB | Facility: MEDICAL CENTER | Age: 56
End: 2021-01-08
Attending: NURSE PRACTITIONER
Payer: COMMERCIAL

## 2021-04-14 ENCOUNTER — HOSPITAL ENCOUNTER (OUTPATIENT)
Dept: LAB | Facility: MEDICAL CENTER | Age: 56
End: 2021-04-14
Attending: STUDENT IN AN ORGANIZED HEALTH CARE EDUCATION/TRAINING PROGRAM
Payer: MEDICARE

## 2021-04-14 LAB
ALBUMIN SERPL BCP-MCNC: 3.9 G/DL (ref 3.2–4.9)
ALBUMIN/GLOB SERPL: 1.6 G/DL
ALP SERPL-CCNC: 66 U/L (ref 30–99)
ALT SERPL-CCNC: 9 U/L (ref 2–50)
ANION GAP SERPL CALC-SCNC: 5 MMOL/L (ref 7–16)
AST SERPL-CCNC: 17 U/L (ref 12–45)
BASOPHILS # BLD AUTO: 0.6 % (ref 0–1.8)
BASOPHILS # BLD: 0.02 K/UL (ref 0–0.12)
BILIRUB SERPL-MCNC: 0.4 MG/DL (ref 0.1–1.5)
BUN SERPL-MCNC: 24 MG/DL (ref 8–22)
CALCIUM SERPL-MCNC: 9.2 MG/DL (ref 8.5–10.5)
CHLORIDE SERPL-SCNC: 110 MMOL/L (ref 96–112)
CHOLEST SERPL-MCNC: 197 MG/DL (ref 100–199)
CO2 SERPL-SCNC: 32 MMOL/L (ref 20–33)
CREAT SERPL-MCNC: 1.03 MG/DL (ref 0.5–1.4)
EOSINOPHIL # BLD AUTO: 0.08 K/UL (ref 0–0.51)
EOSINOPHIL NFR BLD: 2.3 % (ref 0–6.9)
ERYTHROCYTE [DISTWIDTH] IN BLOOD BY AUTOMATED COUNT: 41.2 FL (ref 35.9–50)
FASTING STATUS PATIENT QL REPORTED: NORMAL
GLOBULIN SER CALC-MCNC: 2.5 G/DL (ref 1.9–3.5)
GLUCOSE SERPL-MCNC: 86 MG/DL (ref 65–99)
HCT VFR BLD AUTO: 45.7 % (ref 37–47)
HDLC SERPL-MCNC: 58 MG/DL
HGB BLD-MCNC: 15.5 G/DL (ref 12–16)
IMM GRANULOCYTES # BLD AUTO: 0 K/UL (ref 0–0.11)
IMM GRANULOCYTES NFR BLD AUTO: 0 % (ref 0–0.9)
LDLC SERPL CALC-MCNC: 123 MG/DL
LYMPHOCYTES # BLD AUTO: 2.09 K/UL (ref 1–4.8)
LYMPHOCYTES NFR BLD: 60.6 % (ref 22–41)
MCH RBC QN AUTO: 32.2 PG (ref 27–33)
MCHC RBC AUTO-ENTMCNC: 33.9 G/DL (ref 33.6–35)
MCV RBC AUTO: 94.8 FL (ref 81.4–97.8)
MONOCYTES # BLD AUTO: 0.29 K/UL (ref 0–0.85)
MONOCYTES NFR BLD AUTO: 8.4 % (ref 0–13.4)
NEUTROPHILS # BLD AUTO: 0.97 K/UL (ref 2–7.15)
NEUTROPHILS NFR BLD: 28.1 % (ref 44–72)
NRBC # BLD AUTO: 0 K/UL
NRBC BLD-RTO: 0 /100 WBC
PLATELET # BLD AUTO: 172 K/UL (ref 164–446)
PMV BLD AUTO: 9.9 FL (ref 9–12.9)
POTASSIUM SERPL-SCNC: 5 MMOL/L (ref 3.6–5.5)
PROT SERPL-MCNC: 6.4 G/DL (ref 6–8.2)
RBC # BLD AUTO: 4.82 M/UL (ref 4.2–5.4)
SODIUM SERPL-SCNC: 147 MMOL/L (ref 135–145)
TRIGL SERPL-MCNC: 80 MG/DL (ref 0–149)
TSH SERPL DL<=0.005 MIU/L-ACNC: 11.5 UIU/ML (ref 0.38–5.33)
WBC # BLD AUTO: 3.5 K/UL (ref 4.8–10.8)

## 2021-04-14 PROCEDURE — 36415 COLL VENOUS BLD VENIPUNCTURE: CPT

## 2021-04-14 PROCEDURE — 80053 COMPREHEN METABOLIC PANEL: CPT

## 2021-04-14 PROCEDURE — 80061 LIPID PANEL: CPT

## 2021-04-14 PROCEDURE — 85025 COMPLETE CBC W/AUTO DIFF WBC: CPT

## 2021-04-14 PROCEDURE — 84443 ASSAY THYROID STIM HORMONE: CPT

## 2021-05-14 ENCOUNTER — HOSPITAL ENCOUNTER (OUTPATIENT)
Dept: RADIOLOGY | Facility: MEDICAL CENTER | Age: 56
End: 2021-05-14
Attending: STUDENT IN AN ORGANIZED HEALTH CARE EDUCATION/TRAINING PROGRAM
Payer: MEDICARE

## 2021-05-14 DIAGNOSIS — R06.00 DYSPNEA, PAROXYSMAL NOCTURNAL: ICD-10-CM

## 2021-05-14 PROCEDURE — 71046 X-RAY EXAM CHEST 2 VIEWS: CPT

## 2021-06-22 ENCOUNTER — HOSPITAL ENCOUNTER (OUTPATIENT)
Dept: LAB | Facility: MEDICAL CENTER | Age: 56
End: 2021-06-22
Attending: STUDENT IN AN ORGANIZED HEALTH CARE EDUCATION/TRAINING PROGRAM
Payer: MEDICARE

## 2021-06-22 LAB
ALBUMIN SERPL BCP-MCNC: 3.9 G/DL (ref 3.2–4.9)
ALBUMIN/GLOB SERPL: 1.6 G/DL
ALP SERPL-CCNC: 124 U/L (ref 30–99)
ALT SERPL-CCNC: 26 U/L (ref 2–50)
ANION GAP SERPL CALC-SCNC: 6 MMOL/L (ref 7–16)
AST SERPL-CCNC: 40 U/L (ref 12–45)
BASOPHILS # BLD AUTO: 0.3 % (ref 0–1.8)
BASOPHILS # BLD: 0.01 K/UL (ref 0–0.12)
BILIRUB SERPL-MCNC: 0.3 MG/DL (ref 0.1–1.5)
BUN SERPL-MCNC: 27 MG/DL (ref 8–22)
CALCIUM SERPL-MCNC: 8.8 MG/DL (ref 8.5–10.5)
CHLORIDE SERPL-SCNC: 109 MMOL/L (ref 96–112)
CO2 SERPL-SCNC: 28 MMOL/L (ref 20–33)
CREAT SERPL-MCNC: 1.07 MG/DL (ref 0.5–1.4)
EOSINOPHIL # BLD AUTO: 0.07 K/UL (ref 0–0.51)
EOSINOPHIL NFR BLD: 1.8 % (ref 0–6.9)
ERYTHROCYTE [DISTWIDTH] IN BLOOD BY AUTOMATED COUNT: 40.3 FL (ref 35.9–50)
GLOBULIN SER CALC-MCNC: 2.5 G/DL (ref 1.9–3.5)
GLUCOSE SERPL-MCNC: 89 MG/DL (ref 65–99)
HCT VFR BLD AUTO: 44.8 % (ref 37–47)
HGB BLD-MCNC: 14.6 G/DL (ref 12–16)
IMM GRANULOCYTES # BLD AUTO: 0.01 K/UL (ref 0–0.11)
IMM GRANULOCYTES NFR BLD AUTO: 0.3 % (ref 0–0.9)
LYMPHOCYTES # BLD AUTO: 2.37 K/UL (ref 1–4.8)
LYMPHOCYTES NFR BLD: 62.2 % (ref 22–41)
MCH RBC QN AUTO: 31.2 PG (ref 27–33)
MCHC RBC AUTO-ENTMCNC: 32.6 G/DL (ref 33.6–35)
MCV RBC AUTO: 95.7 FL (ref 81.4–97.8)
MONOCYTES # BLD AUTO: 0.29 K/UL (ref 0–0.85)
MONOCYTES NFR BLD AUTO: 7.6 % (ref 0–13.4)
NEUTROPHILS # BLD AUTO: 1.06 K/UL (ref 2–7.15)
NEUTROPHILS NFR BLD: 27.8 % (ref 44–72)
NRBC # BLD AUTO: 0 K/UL
NRBC BLD-RTO: 0 /100 WBC
PLATELET # BLD AUTO: 120 K/UL (ref 164–446)
PMV BLD AUTO: 9.8 FL (ref 9–12.9)
POTASSIUM SERPL-SCNC: 4.8 MMOL/L (ref 3.6–5.5)
PROT SERPL-MCNC: 6.4 G/DL (ref 6–8.2)
RBC # BLD AUTO: 4.68 M/UL (ref 4.2–5.4)
SODIUM SERPL-SCNC: 143 MMOL/L (ref 135–145)
TSH SERPL DL<=0.005 MIU/L-ACNC: 0.28 UIU/ML (ref 0.38–5.33)
WBC # BLD AUTO: 3.8 K/UL (ref 4.8–10.8)

## 2021-06-22 PROCEDURE — 80053 COMPREHEN METABOLIC PANEL: CPT

## 2021-06-22 PROCEDURE — 36415 COLL VENOUS BLD VENIPUNCTURE: CPT

## 2021-06-22 PROCEDURE — 84443 ASSAY THYROID STIM HORMONE: CPT

## 2021-06-22 PROCEDURE — 85025 COMPLETE CBC W/AUTO DIFF WBC: CPT

## 2021-07-02 ENCOUNTER — PATIENT OUTREACH (OUTPATIENT)
Dept: HEALTH INFORMATION MANAGEMENT | Facility: OTHER | Age: 56
End: 2021-07-02

## 2021-07-02 NOTE — PROGRESS NOTES
Outcome: Left Message    To schedule Comprehensive health  Assessment      Please transfer to Patient Outreach Team at [phone] when patient returns call.      Attempt #1

## 2021-07-16 PROBLEM — G47.00 INSOMNIA: Status: ACTIVE | Noted: 2021-07-16

## 2021-07-16 PROBLEM — Z79.899 CHRONIC USE OF BENZODIAZEPINE FOR THERAPEUTIC PURPOSE: Status: ACTIVE | Noted: 2021-07-16

## 2021-07-20 ENCOUNTER — HOSPITAL ENCOUNTER (OUTPATIENT)
Dept: RADIOLOGY | Facility: MEDICAL CENTER | Age: 56
End: 2021-07-20
Attending: STUDENT IN AN ORGANIZED HEALTH CARE EDUCATION/TRAINING PROGRAM
Payer: MEDICARE

## 2021-07-20 DIAGNOSIS — Z12.31 VISIT FOR SCREENING MAMMOGRAM: ICD-10-CM

## 2021-07-20 PROCEDURE — 77063 BREAST TOMOSYNTHESIS BI: CPT

## 2021-09-01 ENCOUNTER — HOSPITAL ENCOUNTER (OUTPATIENT)
Dept: LAB | Facility: MEDICAL CENTER | Age: 56
End: 2021-09-01
Attending: STUDENT IN AN ORGANIZED HEALTH CARE EDUCATION/TRAINING PROGRAM
Payer: MEDICARE

## 2021-09-01 LAB
ALBUMIN SERPL BCP-MCNC: 3.9 G/DL (ref 3.2–4.9)
ALBUMIN/GLOB SERPL: 1.5 G/DL
ALP SERPL-CCNC: 88 U/L (ref 30–99)
ALT SERPL-CCNC: 15 U/L (ref 2–50)
ANION GAP SERPL CALC-SCNC: 10 MMOL/L (ref 7–16)
AST SERPL-CCNC: 18 U/L (ref 12–45)
BASOPHILS # BLD AUTO: 0.6 % (ref 0–1.8)
BASOPHILS # BLD: 0.02 K/UL (ref 0–0.12)
BILIRUB SERPL-MCNC: 0.2 MG/DL (ref 0.1–1.5)
BUN SERPL-MCNC: 28 MG/DL (ref 8–22)
CALCIUM SERPL-MCNC: 9.3 MG/DL (ref 8.5–10.5)
CHLORIDE SERPL-SCNC: 104 MMOL/L (ref 96–112)
CO2 SERPL-SCNC: 25 MMOL/L (ref 20–33)
CREAT SERPL-MCNC: 0.86 MG/DL (ref 0.5–1.4)
EOSINOPHIL # BLD AUTO: 0.07 K/UL (ref 0–0.51)
EOSINOPHIL NFR BLD: 2 % (ref 0–6.9)
ERYTHROCYTE [DISTWIDTH] IN BLOOD BY AUTOMATED COUNT: 41.7 FL (ref 35.9–50)
GLOBULIN SER CALC-MCNC: 2.6 G/DL (ref 1.9–3.5)
GLUCOSE SERPL-MCNC: 92 MG/DL (ref 65–99)
HCT VFR BLD AUTO: 43.6 % (ref 37–47)
HGB BLD-MCNC: 14.3 G/DL (ref 12–16)
IMM GRANULOCYTES # BLD AUTO: 0.01 K/UL (ref 0–0.11)
IMM GRANULOCYTES NFR BLD AUTO: 0.3 % (ref 0–0.9)
LYMPHOCYTES # BLD AUTO: 1.7 K/UL (ref 1–4.8)
LYMPHOCYTES NFR BLD: 49.3 % (ref 22–41)
MCH RBC QN AUTO: 30.3 PG (ref 27–33)
MCHC RBC AUTO-ENTMCNC: 32.8 G/DL (ref 33.6–35)
MCV RBC AUTO: 92.4 FL (ref 81.4–97.8)
MONOCYTES # BLD AUTO: 0.33 K/UL (ref 0–0.85)
MONOCYTES NFR BLD AUTO: 9.6 % (ref 0–13.4)
NEUTROPHILS # BLD AUTO: 1.32 K/UL (ref 2–7.15)
NEUTROPHILS NFR BLD: 38.2 % (ref 44–72)
NRBC # BLD AUTO: 0 K/UL
NRBC BLD-RTO: 0 /100 WBC
PLATELET # BLD AUTO: 195 K/UL (ref 164–446)
PMV BLD AUTO: 9.6 FL (ref 9–12.9)
POTASSIUM SERPL-SCNC: 4.4 MMOL/L (ref 3.6–5.5)
PROT SERPL-MCNC: 6.5 G/DL (ref 6–8.2)
RBC # BLD AUTO: 4.72 M/UL (ref 4.2–5.4)
SODIUM SERPL-SCNC: 139 MMOL/L (ref 135–145)
T4 FREE SERPL-MCNC: 0.78 NG/DL (ref 0.93–1.7)
TSH SERPL DL<=0.005 MIU/L-ACNC: 1.97 UIU/ML (ref 0.38–5.33)
WBC # BLD AUTO: 3.5 K/UL (ref 4.8–10.8)

## 2021-09-01 PROCEDURE — 85025 COMPLETE CBC W/AUTO DIFF WBC: CPT

## 2021-09-01 PROCEDURE — 36415 COLL VENOUS BLD VENIPUNCTURE: CPT

## 2021-09-01 PROCEDURE — 80053 COMPREHEN METABOLIC PANEL: CPT

## 2021-09-01 PROCEDURE — 84443 ASSAY THYROID STIM HORMONE: CPT

## 2021-09-01 PROCEDURE — 84439 ASSAY OF FREE THYROXINE: CPT

## 2021-11-17 ENCOUNTER — HOSPITAL ENCOUNTER (OUTPATIENT)
Dept: LAB | Facility: MEDICAL CENTER | Age: 56
End: 2021-11-17
Attending: STUDENT IN AN ORGANIZED HEALTH CARE EDUCATION/TRAINING PROGRAM
Payer: MEDICARE

## 2021-11-17 LAB
BASOPHILS # BLD AUTO: 0 % (ref 0–1.8)
BASOPHILS # BLD: 0 K/UL (ref 0–0.12)
EOSINOPHIL # BLD AUTO: 0.1 K/UL (ref 0–0.51)
EOSINOPHIL NFR BLD: 3.6 % (ref 0–6.9)
ERYTHROCYTE [DISTWIDTH] IN BLOOD BY AUTOMATED COUNT: 42.1 FL (ref 35.9–50)
HCT VFR BLD AUTO: 43.8 % (ref 37–47)
HGB BLD-MCNC: 14.7 G/DL (ref 12–16)
LYMPHOCYTES # BLD AUTO: 1.96 K/UL (ref 1–4.8)
LYMPHOCYTES NFR BLD: 67.6 % (ref 22–41)
MANUAL DIFF BLD: ABNORMAL
MCH RBC QN AUTO: 30.9 PG (ref 27–33)
MCHC RBC AUTO-ENTMCNC: 33.6 G/DL (ref 33.6–35)
MCV RBC AUTO: 92.2 FL (ref 81.4–97.8)
MONOCYTES # BLD AUTO: 0.16 K/UL (ref 0–0.85)
MONOCYTES NFR BLD AUTO: 5.4 % (ref 0–13.4)
NEUTROPHILS # BLD AUTO: 0.68 K/UL (ref 2–7.15)
NEUTROPHILS NFR BLD: 23.4 % (ref 44–72)
PLATELET # BLD AUTO: 123 K/UL (ref 164–446)
PMV BLD AUTO: 9.7 FL (ref 9–12.9)
RBC # BLD AUTO: 4.75 M/UL (ref 4.2–5.4)
WBC # BLD AUTO: 2.9 K/UL (ref 4.8–10.8)

## 2021-11-17 PROCEDURE — 85007 BL SMEAR W/DIFF WBC COUNT: CPT

## 2021-11-17 PROCEDURE — 80500 HCHG CLINICAL PATH CONSULT-LIMITED: CPT

## 2021-11-17 PROCEDURE — 85027 COMPLETE CBC AUTOMATED: CPT

## 2021-11-17 PROCEDURE — 36415 COLL VENOUS BLD VENIPUNCTURE: CPT

## 2021-11-18 LAB
PATH REV: NORMAL
PATH REV: NORMAL

## 2021-12-09 ENCOUNTER — HOSPITAL ENCOUNTER (OUTPATIENT)
Facility: MEDICAL CENTER | Age: 56
End: 2021-12-09
Attending: INTERNAL MEDICINE
Payer: MEDICARE

## 2021-12-09 PROCEDURE — 82232 ASSAY OF BETA-2 PROTEIN: CPT

## 2021-12-09 PROCEDURE — 82746 ASSAY OF FOLIC ACID SERUM: CPT

## 2021-12-10 LAB — FOLATE SERPL-MCNC: 31.5 NG/ML

## 2021-12-13 LAB — B2 MICROGLOB SERPL-MCNC: 2.3 MG/L (ref 1.1–2.4)

## 2022-03-08 PROBLEM — D70.9 NEUTROPENIA (HCC): Status: ACTIVE | Noted: 2022-03-08

## 2022-03-08 PROBLEM — F32.5 MAJOR DEPRESSION IN REMISSION (HCC): Status: ACTIVE | Noted: 2022-03-08

## 2022-03-08 PROBLEM — F11.20 UNCOMPLICATED OPIOID DEPENDENCE (HCC): Status: ACTIVE | Noted: 2022-03-08

## 2022-03-15 ENCOUNTER — HOSPITAL ENCOUNTER (OUTPATIENT)
Dept: LAB | Facility: MEDICAL CENTER | Age: 57
End: 2022-03-15
Attending: STUDENT IN AN ORGANIZED HEALTH CARE EDUCATION/TRAINING PROGRAM
Payer: MEDICARE

## 2022-03-15 LAB
ALBUMIN SERPL BCP-MCNC: 3.6 G/DL (ref 3.2–4.9)
ALBUMIN/GLOB SERPL: 1.8 G/DL
ALP SERPL-CCNC: 70 U/L (ref 30–99)
ALT SERPL-CCNC: 10 U/L (ref 2–50)
ANION GAP SERPL CALC-SCNC: 8 MMOL/L (ref 7–16)
AST SERPL-CCNC: 17 U/L (ref 12–45)
BILIRUB SERPL-MCNC: 0.3 MG/DL (ref 0.1–1.5)
BUN SERPL-MCNC: 23 MG/DL (ref 8–22)
CALCIUM SERPL-MCNC: 9 MG/DL (ref 8.5–10.5)
CHLORIDE SERPL-SCNC: 108 MMOL/L (ref 96–112)
CO2 SERPL-SCNC: 31 MMOL/L (ref 20–33)
CREAT SERPL-MCNC: 0.92 MG/DL (ref 0.5–1.4)
FASTING STATUS PATIENT QL REPORTED: NORMAL
FERRITIN SERPL-MCNC: 67.6 NG/ML (ref 10–291)
FOLATE SERPL-MCNC: >40 NG/ML
GFR SERPLBLD CREATININE-BSD FMLA CKD-EPI: 74 ML/MIN/1.73 M 2
GLOBULIN SER CALC-MCNC: 2 G/DL (ref 1.9–3.5)
GLUCOSE SERPL-MCNC: 92 MG/DL (ref 65–99)
MAGNESIUM SERPL-MCNC: 2 MG/DL (ref 1.5–2.5)
POTASSIUM SERPL-SCNC: 4.7 MMOL/L (ref 3.6–5.5)
PROT SERPL-MCNC: 5.6 G/DL (ref 6–8.2)
SODIUM SERPL-SCNC: 147 MMOL/L (ref 135–145)
TSH SERPL DL<=0.005 MIU/L-ACNC: 3.88 UIU/ML (ref 0.38–5.33)
VIT B12 SERPL-MCNC: 868 PG/ML (ref 211–911)

## 2022-03-15 PROCEDURE — 82728 ASSAY OF FERRITIN: CPT

## 2022-03-15 PROCEDURE — 36415 COLL VENOUS BLD VENIPUNCTURE: CPT

## 2022-03-15 PROCEDURE — 83735 ASSAY OF MAGNESIUM: CPT

## 2022-03-15 PROCEDURE — 84443 ASSAY THYROID STIM HORMONE: CPT

## 2022-03-15 PROCEDURE — 80053 COMPREHEN METABOLIC PANEL: CPT

## 2022-03-15 PROCEDURE — 82746 ASSAY OF FOLIC ACID SERUM: CPT

## 2022-03-15 PROCEDURE — 82607 VITAMIN B-12: CPT

## 2022-06-06 DIAGNOSIS — Z00.6 RESEARCH STUDY PATIENT: ICD-10-CM

## 2022-06-10 ENCOUNTER — HOSPITAL ENCOUNTER (OUTPATIENT)
Facility: MEDICAL CENTER | Age: 57
End: 2022-06-10
Attending: PATHOLOGY
Payer: COMMERCIAL

## 2022-06-10 DIAGNOSIS — Z00.6 RESEARCH STUDY PATIENT: ICD-10-CM

## 2022-06-16 LAB
ELF SCORE: 9.6
RELATIVE RISK: NORMAL
RISK GROUP: NORMAL
RISK: 3.3 %

## 2022-07-01 ENCOUNTER — APPOINTMENT (OUTPATIENT)
Dept: RADIOLOGY | Facility: MEDICAL CENTER | Age: 57
End: 2022-07-01
Attending: STUDENT IN AN ORGANIZED HEALTH CARE EDUCATION/TRAINING PROGRAM
Payer: MEDICARE

## 2022-07-02 ENCOUNTER — HOSPITAL ENCOUNTER (OUTPATIENT)
Facility: MEDICAL CENTER | Age: 57
End: 2022-07-04
Attending: EMERGENCY MEDICINE | Admitting: HOSPITALIST
Payer: MEDICARE

## 2022-07-02 ENCOUNTER — APPOINTMENT (OUTPATIENT)
Dept: RADIOLOGY | Facility: MEDICAL CENTER | Age: 57
End: 2022-07-02
Attending: EMERGENCY MEDICINE
Payer: MEDICARE

## 2022-07-02 DIAGNOSIS — R41.82 ALTERED MENTAL STATUS, UNSPECIFIED ALTERED MENTAL STATUS TYPE: ICD-10-CM

## 2022-07-02 DIAGNOSIS — G24.02 DYSTONIC DRUG REACTION: ICD-10-CM

## 2022-07-02 PROBLEM — G93.40 ACUTE ENCEPHALOPATHY: Status: ACTIVE | Noted: 2022-07-02

## 2022-07-02 LAB
ALBUMIN SERPL BCP-MCNC: 4 G/DL (ref 3.2–4.9)
ALBUMIN/GLOB SERPL: 1.7 G/DL
ALP SERPL-CCNC: 80 U/L (ref 30–99)
ALT SERPL-CCNC: 9 U/L (ref 2–50)
AMMONIA PLAS-SCNC: 25 UMOL/L (ref 11–45)
AMMONIA PLAS-SCNC: 70 UMOL/L (ref 11–45)
AMPHET UR QL SCN: NEGATIVE
ANION GAP SERPL CALC-SCNC: 12 MMOL/L (ref 7–16)
APAP SERPL-MCNC: <5 UG/ML (ref 10–30)
APPEARANCE UR: ABNORMAL
AST SERPL-CCNC: 14 U/L (ref 12–45)
BACTERIA #/AREA URNS HPF: NEGATIVE /HPF
BARBITURATES UR QL SCN: NEGATIVE
BASOPHILS # BLD AUTO: 0.4 % (ref 0–1.8)
BASOPHILS # BLD: 0.02 K/UL (ref 0–0.12)
BENZODIAZ UR QL SCN: POSITIVE
BILIRUB SERPL-MCNC: 0.2 MG/DL (ref 0.1–1.5)
BILIRUB UR QL STRIP.AUTO: NEGATIVE
BUN SERPL-MCNC: 26 MG/DL (ref 8–22)
BZE UR QL SCN: NEGATIVE
CALCIUM SERPL-MCNC: 9 MG/DL (ref 8.5–10.5)
CANNABINOIDS UR QL SCN: NEGATIVE
CHLORIDE SERPL-SCNC: 107 MMOL/L (ref 96–112)
CK SERPL-CCNC: 121 U/L (ref 0–154)
CO2 SERPL-SCNC: 22 MMOL/L (ref 20–33)
COLOR UR: YELLOW
CREAT SERPL-MCNC: 0.86 MG/DL (ref 0.5–1.4)
EKG IMPRESSION: NORMAL
EOSINOPHIL # BLD AUTO: 0.02 K/UL (ref 0–0.51)
EOSINOPHIL NFR BLD: 0.4 % (ref 0–6.9)
EPI CELLS #/AREA URNS HPF: NEGATIVE /HPF
ERYTHROCYTE [DISTWIDTH] IN BLOOD BY AUTOMATED COUNT: 41.4 FL (ref 35.9–50)
ETHANOL BLD-MCNC: <10.1 MG/DL
ETHANOL BLD-MCNC: <10.1 MG/DL
GFR SERPLBLD CREATININE-BSD FMLA CKD-EPI: 80 ML/MIN/1.73 M 2
GLOBULIN SER CALC-MCNC: 2.4 G/DL (ref 1.9–3.5)
GLUCOSE BLD STRIP.AUTO-MCNC: 106 MG/DL (ref 65–99)
GLUCOSE SERPL-MCNC: 112 MG/DL (ref 65–99)
GLUCOSE UR STRIP.AUTO-MCNC: NEGATIVE MG/DL
HCT VFR BLD AUTO: 42.9 % (ref 37–47)
HGB BLD-MCNC: 14.4 G/DL (ref 12–16)
HYALINE CASTS #/AREA URNS LPF: ABNORMAL /LPF
IMM GRANULOCYTES # BLD AUTO: 0.02 K/UL (ref 0–0.11)
IMM GRANULOCYTES NFR BLD AUTO: 0.4 % (ref 0–0.9)
KETONES UR STRIP.AUTO-MCNC: NEGATIVE MG/DL
LEUKOCYTE ESTERASE UR QL STRIP.AUTO: NEGATIVE
LYMPHOCYTES # BLD AUTO: 1.19 K/UL (ref 1–4.8)
LYMPHOCYTES NFR BLD: 25.4 % (ref 22–41)
MCH RBC QN AUTO: 32.5 PG (ref 27–33)
MCHC RBC AUTO-ENTMCNC: 33.6 G/DL (ref 33.6–35)
MCV RBC AUTO: 96.8 FL (ref 81.4–97.8)
METHADONE UR QL SCN: NEGATIVE
MICRO URNS: ABNORMAL
MONOCYTES # BLD AUTO: 0.36 K/UL (ref 0–0.85)
MONOCYTES NFR BLD AUTO: 7.7 % (ref 0–13.4)
NEUTROPHILS # BLD AUTO: 3.08 K/UL (ref 2–7.15)
NEUTROPHILS NFR BLD: 65.7 % (ref 44–72)
NITRITE UR QL STRIP.AUTO: NEGATIVE
NRBC # BLD AUTO: 0 K/UL
NRBC BLD-RTO: 0 /100 WBC
OPIATES UR QL SCN: NEGATIVE
OXYCODONE UR QL SCN: NEGATIVE
PCP UR QL SCN: NEGATIVE
PH UR STRIP.AUTO: 8 [PH] (ref 5–8)
PLATELET # BLD AUTO: 139 K/UL (ref 164–446)
PMV BLD AUTO: 8.9 FL (ref 9–12.9)
POTASSIUM SERPL-SCNC: 4.2 MMOL/L (ref 3.6–5.5)
PROPOXYPH UR QL SCN: NEGATIVE
PROT SERPL-MCNC: 6.4 G/DL (ref 6–8.2)
PROT UR QL STRIP: NEGATIVE MG/DL
RBC # BLD AUTO: 4.43 M/UL (ref 4.2–5.4)
RBC # URNS HPF: ABNORMAL /HPF
RBC UR QL AUTO: ABNORMAL
SALICYLATES SERPL-MCNC: <1 MG/DL (ref 15–25)
SODIUM SERPL-SCNC: 141 MMOL/L (ref 135–145)
SP GR UR STRIP.AUTO: 1.01
UROBILINOGEN UR STRIP.AUTO-MCNC: 1 MG/DL
WBC # BLD AUTO: 4.7 K/UL (ref 4.8–10.8)
WBC #/AREA URNS HPF: ABNORMAL /HPF

## 2022-07-02 PROCEDURE — 700111 HCHG RX REV CODE 636 W/ 250 OVERRIDE (IP): Performed by: EMERGENCY MEDICINE

## 2022-07-02 PROCEDURE — G0378 HOSPITAL OBSERVATION PER HR: HCPCS

## 2022-07-02 PROCEDURE — 96374 THER/PROPH/DIAG INJ IV PUSH: CPT

## 2022-07-02 PROCEDURE — 96372 THER/PROPH/DIAG INJ SC/IM: CPT | Mod: XU

## 2022-07-02 PROCEDURE — 80143 DRUG ASSAY ACETAMINOPHEN: CPT

## 2022-07-02 PROCEDURE — 96376 TX/PRO/DX INJ SAME DRUG ADON: CPT

## 2022-07-02 PROCEDURE — 93005 ELECTROCARDIOGRAM TRACING: CPT | Performed by: EMERGENCY MEDICINE

## 2022-07-02 PROCEDURE — 99220 PR INITIAL OBSERVATION CARE,LEVL III: CPT | Performed by: HOSPITALIST

## 2022-07-02 PROCEDURE — 82550 ASSAY OF CK (CPK): CPT

## 2022-07-02 PROCEDURE — 81001 URINALYSIS AUTO W/SCOPE: CPT | Mod: XU

## 2022-07-02 PROCEDURE — 85025 COMPLETE CBC W/AUTO DIFF WBC: CPT

## 2022-07-02 PROCEDURE — 82962 GLUCOSE BLOOD TEST: CPT

## 2022-07-02 PROCEDURE — 82077 ASSAY SPEC XCP UR&BREATH IA: CPT

## 2022-07-02 PROCEDURE — 80307 DRUG TEST PRSMV CHEM ANLYZR: CPT

## 2022-07-02 PROCEDURE — 82140 ASSAY OF AMMONIA: CPT

## 2022-07-02 PROCEDURE — 80179 DRUG ASSAY SALICYLATE: CPT

## 2022-07-02 PROCEDURE — 700111 HCHG RX REV CODE 636 W/ 250 OVERRIDE (IP): Performed by: HOSPITALIST

## 2022-07-02 PROCEDURE — 99285 EMERGENCY DEPT VISIT HI MDM: CPT

## 2022-07-02 PROCEDURE — 36415 COLL VENOUS BLD VENIPUNCTURE: CPT

## 2022-07-02 PROCEDURE — 70450 CT HEAD/BRAIN W/O DYE: CPT | Mod: ME

## 2022-07-02 PROCEDURE — 700105 HCHG RX REV CODE 258: Performed by: HOSPITALIST

## 2022-07-02 PROCEDURE — 96375 TX/PRO/DX INJ NEW DRUG ADDON: CPT

## 2022-07-02 PROCEDURE — 80053 COMPREHEN METABOLIC PANEL: CPT

## 2022-07-02 RX ORDER — SODIUM CHLORIDE 9 MG/ML
INJECTION, SOLUTION INTRAVENOUS CONTINUOUS
Status: DISCONTINUED | OUTPATIENT
Start: 2022-07-02 | End: 2022-07-04 | Stop reason: ALTCHOICE

## 2022-07-02 RX ORDER — DIPHENHYDRAMINE HYDROCHLORIDE 50 MG/ML
50 INJECTION INTRAMUSCULAR; INTRAVENOUS ONCE
Status: COMPLETED | OUTPATIENT
Start: 2022-07-02 | End: 2022-07-02

## 2022-07-02 RX ORDER — GABAPENTIN 300 MG/1
900 CAPSULE ORAL 3 TIMES DAILY
COMMUNITY
End: 2022-12-08

## 2022-07-02 RX ORDER — BISACODYL 10 MG
10 SUPPOSITORY, RECTAL RECTAL
Status: DISCONTINUED | OUTPATIENT
Start: 2022-07-02 | End: 2022-07-04 | Stop reason: HOSPADM

## 2022-07-02 RX ORDER — ONDANSETRON 2 MG/ML
4 INJECTION INTRAMUSCULAR; INTRAVENOUS EVERY 4 HOURS PRN
Status: DISCONTINUED | OUTPATIENT
Start: 2022-07-02 | End: 2022-07-04 | Stop reason: HOSPADM

## 2022-07-02 RX ORDER — PROMETHAZINE HYDROCHLORIDE 25 MG/1
12.5-25 SUPPOSITORY RECTAL EVERY 4 HOURS PRN
Status: DISCONTINUED | OUTPATIENT
Start: 2022-07-02 | End: 2022-07-04 | Stop reason: HOSPADM

## 2022-07-02 RX ORDER — POLYETHYLENE GLYCOL 3350 17 G/17G
1 POWDER, FOR SOLUTION ORAL
Status: DISCONTINUED | OUTPATIENT
Start: 2022-07-02 | End: 2022-07-04 | Stop reason: HOSPADM

## 2022-07-02 RX ORDER — ENOXAPARIN SODIUM 100 MG/ML
40 INJECTION SUBCUTANEOUS DAILY
Status: DISCONTINUED | OUTPATIENT
Start: 2022-07-02 | End: 2022-07-04 | Stop reason: HOSPADM

## 2022-07-02 RX ORDER — BUPRENORPHINE AND NALOXONE 8; 2 MG/1; MG/1
1.5 FILM, SOLUBLE BUCCAL; SUBLINGUAL DAILY
COMMUNITY

## 2022-07-02 RX ORDER — PROMETHAZINE HYDROCHLORIDE 25 MG/1
12.5-25 TABLET ORAL EVERY 4 HOURS PRN
Status: DISCONTINUED | OUTPATIENT
Start: 2022-07-02 | End: 2022-07-04 | Stop reason: HOSPADM

## 2022-07-02 RX ORDER — DIVALPROEX SODIUM 500 MG/1
1500 TABLET, DELAYED RELEASE ORAL
COMMUNITY

## 2022-07-02 RX ORDER — ACETAMINOPHEN 325 MG/1
650 TABLET ORAL EVERY 6 HOURS PRN
Status: DISCONTINUED | OUTPATIENT
Start: 2022-07-02 | End: 2022-07-04 | Stop reason: HOSPADM

## 2022-07-02 RX ORDER — PROCHLORPERAZINE EDISYLATE 5 MG/ML
5-10 INJECTION INTRAMUSCULAR; INTRAVENOUS EVERY 4 HOURS PRN
Status: DISCONTINUED | OUTPATIENT
Start: 2022-07-02 | End: 2022-07-04 | Stop reason: HOSPADM

## 2022-07-02 RX ORDER — ONDANSETRON 4 MG/1
4 TABLET, ORALLY DISINTEGRATING ORAL EVERY 4 HOURS PRN
Status: DISCONTINUED | OUTPATIENT
Start: 2022-07-02 | End: 2022-07-04 | Stop reason: HOSPADM

## 2022-07-02 RX ORDER — SUVOREXANT 20 MG/1
20 TABLET, FILM COATED ORAL
COMMUNITY
End: 2022-12-08

## 2022-07-02 RX ORDER — OLANZAPINE AND SAMIDORPHAN L-MALATE 20; 10 MG/1; MG/1
1 TABLET, FILM COATED ORAL
Status: ON HOLD | COMMUNITY
End: 2022-07-04

## 2022-07-02 RX ORDER — LORAZEPAM 2 MG/ML
2 INJECTION INTRAMUSCULAR ONCE
Status: COMPLETED | OUTPATIENT
Start: 2022-07-02 | End: 2022-07-02

## 2022-07-02 RX ORDER — AMOXICILLIN 250 MG
2 CAPSULE ORAL 2 TIMES DAILY
Status: DISCONTINUED | OUTPATIENT
Start: 2022-07-02 | End: 2022-07-04 | Stop reason: HOSPADM

## 2022-07-02 RX ADMIN — ENOXAPARIN SODIUM 40 MG: 40 INJECTION SUBCUTANEOUS at 19:00

## 2022-07-02 RX ADMIN — SODIUM CHLORIDE: 9 INJECTION, SOLUTION INTRAVENOUS at 10:54

## 2022-07-02 RX ADMIN — LORAZEPAM 2 MG: 2 INJECTION INTRAMUSCULAR; INTRAVENOUS at 02:00

## 2022-07-02 RX ADMIN — DIPHENHYDRAMINE HYDROCHLORIDE 50 MG: 50 INJECTION, SOLUTION INTRAMUSCULAR; INTRAVENOUS at 01:15

## 2022-07-02 RX ADMIN — DIPHENHYDRAMINE HYDROCHLORIDE 50 MG: 50 INJECTION, SOLUTION INTRAMUSCULAR; INTRAVENOUS at 03:30

## 2022-07-02 ASSESSMENT — PATIENT HEALTH QUESTIONNAIRE - PHQ9
1. LITTLE INTEREST OR PLEASURE IN DOING THINGS: NOT AT ALL
SUM OF ALL RESPONSES TO PHQ9 QUESTIONS 1 AND 2: 0
2. FEELING DOWN, DEPRESSED, IRRITABLE, OR HOPELESS: NOT AT ALL

## 2022-07-02 ASSESSMENT — LIFESTYLE VARIABLES
TOTAL SCORE: 0
TOTAL SCORE: 0
AVERAGE NUMBER OF DAYS PER WEEK YOU HAVE A DRINK CONTAINING ALCOHOL: 0
HAVE YOU EVER FELT YOU SHOULD CUT DOWN ON YOUR DRINKING: NO
ALCOHOL_USE: NO
DOES PATIENT WANT TO STOP DRINKING: NO
TOTAL SCORE: 0
CONSUMPTION TOTAL: NEGATIVE
ON A TYPICAL DAY WHEN YOU DRINK ALCOHOL HOW MANY DRINKS DO YOU HAVE: 0
HOW MANY TIMES IN THE PAST YEAR HAVE YOU HAD 5 OR MORE DRINKS IN A DAY: 0
EVER FELT BAD OR GUILTY ABOUT YOUR DRINKING: NO
HAVE PEOPLE ANNOYED YOU BY CRITICIZING YOUR DRINKING: NO
EVER HAD A DRINK FIRST THING IN THE MORNING TO STEADY YOUR NERVES TO GET RID OF A HANGOVER: NO

## 2022-07-02 ASSESSMENT — PAIN DESCRIPTION - PAIN TYPE: TYPE: ACUTE PAIN

## 2022-07-02 ASSESSMENT — FIBROSIS 4 INDEX
FIB4 SCORE: 1.81
FIB4 SCORE: 2.36

## 2022-07-02 NOTE — H&P
Hospital Medicine History & Physical Note    Date of Service  7/2/2022    Primary Care Physician  Vidhya Uribe P.A.-C.    Consultants      Code Status  Full Code    Chief Complaint  Chief Complaint   Patient presents with   • ALOC     PT called 911 for SOB after taking a new bipolar med.  REMSA while at PT's home PT got aggitated and was given 2 mg of versed. PT then saying her Fibromyalgia was acting up and then became even more agitated and was transported to the ED as an altered PT.       History of Presenting Illness  Jemima Piña is a 56 y.o. female who presented 7/2/2022 with agitation.  Patient is unable to provide any history at the time of my examination history was obtained from review of available records.  The patient apparently called 911 because she was not feeling well after taking a new bipolar medication Lybalvi reports she took only 1 dose.  When the paramedics arrived she was agitated and received Versed and transferred to the emergency department where she was noted to have some twitching movement of all her extremities and intermittent agitation and had received 50 mg of Benadryl x2 and lorazepam 2 mg in the emergency department.  Apparently earlier today she was able to tell her nurse name of the medication she took.  Initial ammonia level was 70 however repeat ammonia level was normal.  CT of the head was negative for acute pathology.  Urine drug screen was only positive for benzodiazepines as expected as she had received Versed prior to transfer.  She is currently on room air and is protecting her airway    I discussed the plan of care with bedside RN.    Review of Systems  Review of Systems   Unable to perform ROS: Mental status change       Past Medical History   has a past medical history of Anxiety state, unspecified, Bipolar I disorder, most recent episode (or current) depressed, unspecified, Breast cancer (HCC) (2010), Chronic fatigue syndrome, CKD (chronic kidney disease) stage  2, GFR 60-89 ml/min, Congenital hypertrophic pyloric stenosis, Depression, Edema, Endometriosis, site unspecified, Hemorrhage of rectum and anus, Myalgia and myositis, unspecified, Other and unspecified ovarian cyst, Peptic ulcer, unspecified site, unspecified as acute or chronic, without mention of hemorrhage, perforation, or obstruction, Temporomandibular joint disorders, unspecified, Tobacco use disorder, Unspecified hypothyroidism, and Vaginal candidiasis (10/9/12).    Surgical History   has a past surgical history that includes tonsillectomy (1981); cholecystectomy (1991); thyroid lobectomy (1999); arthroscopy, knee; tubal coagulation laparoscopic bilateral (1994); and lumpectomy.     Family History  family history includes Asthma in her child; Cancer in her maternal grandfather and paternal grandfather; Clotting Disorder in her father; Lung Disease in her child; Other in her mother and sister; Psychiatric Illness in her mother and sister.       Social History   reports that she quit smoking about 5 years ago. Her smoking use included cigarettes. She has never used smokeless tobacco. She reports that she does not drink alcohol and does not use drugs.    Allergies  Allergies   Allergen Reactions   • Aminophylline      Original entry read AMNOPHYLIN      • Asa [Aspirin]    • Cyclacillin      Original entry read cyclines   • Flu Virus Vaccine    • Pcn [Penicillins]    • Reglan [Metoclopramide Hcl]    • Trazodone      Increased anxiety       Medications  Prior to Admission Medications   Prescriptions Last Dose Informant Patient Reported? Taking?   OLANZapine-Samidorphan (LYBALVI PO) 7/1/2022 at Unknown time  Yes Yes   Sig: Take  by mouth.   ROPINIRole (REQUIP) 0.5 MG Tab   Yes No   Sig: Take 0.5 mg by mouth every day.   buprenorphine-naloxone (SUBOXONE) 8-2 mg SL Tab SL   Yes No   Sig: Place 1 tablet under the tongue every day.   chlorproMAZINE (THORAZINE) 100 MG tablet   No No   Sig: Take 2 Tabs by mouth every  bedtime.   escitalopram (LEXAPRO) 20 MG tablet   Yes No   Sig: Take 20 mg by mouth every day.   gabapentin (NEURONTIN) 300 MG CAPS   No No   Sig: Take three tablets orally daily.      Facility-Administered Medications: None       Physical Exam  Temp:  [36.5 °C (97.7 °F)-36.7 °C (98.1 °F)] 36.7 °C (98.1 °F)  Pulse:  [77-82] 82  Resp:  [16-18] 16  BP: (140-154)/(74-77) 140/77  SpO2:  [92 %-99 %] 92 %  Blood Pressure: (!) 140/77   Temperature: 36.7 °C (98.1 °F)   Pulse: 82   Respiration: 16   Pulse Oximetry: 92 %       Physical Exam  Vitals and nursing note reviewed.   Constitutional:       General: She is not in acute distress.     Comments: Lethargic   HENT:      Head: Normocephalic and atraumatic.      Nose: Nose normal. No rhinorrhea.      Mouth/Throat:      Pharynx: No oropharyngeal exudate or posterior oropharyngeal erythema.   Eyes:      General: No scleral icterus.        Right eye: No discharge.         Left eye: No discharge.   Cardiovascular:      Rate and Rhythm: Normal rate and regular rhythm.      Heart sounds: Normal heart sounds. No murmur heard.    No friction rub. No gallop.   Pulmonary:      Effort: Pulmonary effort is normal. No respiratory distress.      Breath sounds: Normal breath sounds. No stridor. No wheezing, rhonchi or rales.   Chest:      Chest wall: No tenderness.   Abdominal:      General: Bowel sounds are normal. There is no distension.      Palpations: Abdomen is soft. There is no mass.      Tenderness: There is no abdominal tenderness. There is no rebound.   Musculoskeletal:         General: No swelling or tenderness.      Cervical back: Neck supple. No rigidity.   Skin:     General: Skin is warm and dry.      Coloration: Skin is not cyanotic or jaundiced.      Nails: There is no clubbing.   Neurological:      General: No focal deficit present.      Cranial Nerves: No cranial nerve deficit.      Motor: No weakness.      Comments: Patient does not follow commands but is able to  spontaneously move all extremities with no focal deficits noted     Psychiatric:         Speech: She is noncommunicative.      Comments: Patient is lethargic becomes restless when stimulated           Laboratory:  Recent Labs     07/02/22 0208   WBC 4.7*   RBC 4.43   HEMOGLOBIN 14.4   HEMATOCRIT 42.9   MCV 96.8   MCH 32.5   MCHC 33.6   RDW 41.4   PLATELETCT 139*   MPV 8.9*     Recent Labs     07/02/22 0208   SODIUM 141   POTASSIUM 4.2   CHLORIDE 107   CO2 22   GLUCOSE 112*   BUN 26*   CREATININE 0.86   CALCIUM 9.0     Recent Labs     07/02/22  0208   ALTSGPT 9   ASTSGOT 14   ALKPHOSPHAT 80   TBILIRUBIN 0.2   GLUCOSE 112*         No results for input(s): NTPROBNP in the last 72 hours.      No results for input(s): TROPONINT in the last 72 hours.    Imaging:  CT-HEAD W/O   Final Result         1.  No acute intracranial abnormality.   2.  Atherosclerosis.                 Assessment/Plan:  Justification for Admission Status  I anticipate this patient is appropriate for observation status at this time because Expect improvement as sedating medications are cleared from her system    * Acute encephalopathy- (present on admission)  Assessment & Plan  Possibly secondary to her new medication Libalvi in the setting of multiple sedating agents  Home med list includes Suboxone Thorazine gabapentin and Lexapro and Requip    Hold sedating medications  Close clinical monitoring and aspiration precautions  We will keep her n.p.o. until mental status is improved and resolved obtain further history and resume diet as tolerated      Fibromyalgia- (present on admission)  Assessment & Plan  With chronic narcotic dependence patient maintained on Suboxone last prescription filled on 6/8/2022 per review of PDMP  Hold all sedating agents for now until mental status improved    Bipolar I disorder, most recent episode depressed (HCC)- (present on admission)  Assessment & Plan  Hold lybalvi   Obtain further history when patient mental status  is improved      VTE prophylaxis: enoxaparin ppx

## 2022-07-02 NOTE — ED NOTES
Pt was able to wake up and spell the name of the new medication she took last night, ERP was at the bedside for this. The medication is called Lybalvi.

## 2022-07-02 NOTE — ASSESSMENT & PLAN NOTE
With chronic narcotic dependence patient maintained on Suboxone last prescription filled on 6/8/2022 per review of PDMP    Resume home medications except for lybalvi and insomnia medications   standing/walking/walker prn

## 2022-07-02 NOTE — PROGRESS NOTES
Received pt from ED in [facility],on arrival pt doesnot open eyes to verbal,responding to painful stimuli and follow commands like to lift arms and BLLE,pt keeping her eyes closed,unable to get any info from patient,v/s wnl,will monitor close.

## 2022-07-02 NOTE — PROGRESS NOTES
Pt more awake,pt was able to get to the [facility] and pass urine,still drowsy,will keep npo till she is fully awake.

## 2022-07-02 NOTE — ED PROVIDER NOTES
ED Provider Note    CHIEF COMPLAINT  Chief Complaint   Patient presents with   • ALOC     PT called 911 for SOB after taking a new bipolar med.  REMSA while at PT's home PT got aggitated and was given 2 mg of versed. PT then saying her Fibromyalgia was acting up and then became even more agitated and was transported to the ED as an altered PT.       HPI  Jemima Piña is a 56 y.o. female who presents to the emergency department for behavior change.  Apparently the patient called 911 because she stated she was not feeling well after taking a new bipolar medication.  When EMS arrived to the patient's house they state that she got pretty agitated and so they gave her 2 mg of Versed.  On my evaluation the patient is somewhat alert she is on the gurney she is definitely opening her eyes she is moving all of her extremities and she more just is kind of fidgeting with a little bit of generalized muscle twitching.  She cannot tell me what medication she took she can say that I took a med I took a med but other than that I cannot get a great history out of her.  Blood glucose will be checked there is no signs of trauma on the patient she is moving all extremities without difficulty and I do not notice any focal exam findings    REVIEW OF SYSTEMS  Limited secondary to altered mental status  All other review of systems are negative    PAST MEDICAL HISTORY   has a past medical history of Anxiety state, unspecified, Bipolar I disorder, most recent episode (or current) depressed, unspecified, Breast cancer (HCC) (2010), Chronic fatigue syndrome, CKD (chronic kidney disease) stage 2, GFR 60-89 ml/min, Congenital hypertrophic pyloric stenosis, Depression, Edema, Endometriosis, site unspecified, Hemorrhage of rectum and anus, Myalgia and myositis, unspecified, Other and unspecified ovarian cyst, Peptic ulcer, unspecified site, unspecified as acute or chronic, without mention of hemorrhage, perforation, or obstruction,  "Temporomandibular joint disorders, unspecified, Tobacco use disorder, Unspecified hypothyroidism, and Vaginal candidiasis (10/9/12).    SOCIAL HISTORY  Social History     Tobacco Use   • Smoking status: Former Smoker     Types: Cigarettes     Quit date: 2016     Years since quittin.6   • Smokeless tobacco: Never Used   • Tobacco comment: expect with her emotional disease that she cannot quit at this time   Substance and Sexual Activity   • Alcohol use: No   • Drug use: No   • Sexual activity: Not on file       SURGICAL HISTORY   has a past surgical history that includes tonsillectomy (); cholecystectomy (); thyroid lobectomy (); arthroscopy, knee; tubal coagulation laparoscopic bilateral (); and lumpectomy.    CURRENT MEDICATIONS  Home Medications    **Home medications have not yet been reviewed for this encounter**         ALLERGIES  Allergies   Allergen Reactions   • Aminophylline      Original entry read AMNOPHYLIN      • Asa [Aspirin]    • Cyclacillin      Original entry read cyclines   • Flu Virus Vaccine    • Pcn [Penicillins]    • Reglan [Metoclopramide Hcl]    • Trazodone      Increased anxiety       PHYSICAL EXAM  VITAL SIGNS: BP (!) 154/77   Pulse 77   Temp 36.5 °C (97.7 °F) (Temporal)   Resp 16   Ht 1.549 m (5' 0.98\")   Wt 71.6 kg (157 lb 13.6 oz)   LMP 2022 (Approximate)   SpO2 96%   BMI 29.84 kg/m²    Pulse ox interpretation: I interpret this pulse ox as normal.  Constitutional: Awake but agitated  HENT: Normocephalic atraumatic, MMM, mouth clenching  Eyes: PER, Conjunctiva normal, Non-icteric.   Neck: Normal range of motion, No tenderness, Supple, No stridor.   Cardiovascular: Regular rate and rhythm, no murmurs.   Thorax & Lungs: Normal breath sounds, No respiratory distress, No wheezing, No chest tenderness.   Abdomen: Bowel sounds normal, Soft, No tenderness, No pulsatile masses. No peritoneal signs.  Skin: Warm, Dry, No erythema, No rash.   Back: No bony " tenderness, No CVA tenderness.   Extremities/MSK: Intact equal distal pulses, No edema, No tenderness, No cyanosis, no major deformities noted  Generalized kind of twitching rigid muscles  Neurologic: Alert oriented to person but otherwise I cannot get a good orientation again moving all extremities somewhat equally no evidence of facial droop      DIFFERENTIAL DIAGNOSIS AND WORK UP PLAN    This is a 56 y.o. female who presents with altered mental status likely she is having a dystonic reaction, she was already given benzos prior to arrival similar treated with IV Benadryl, blood glucose was within normal limits she is not febrile she is otherwise well-appearing with no signs of trauma.    DIAGNOSTIC STUDIES / PROCEDURES    EKG  Results for orders placed or performed during the hospital encounter of 06/15/19   EKG (NOW)   Result Value Ref Range    Report       [facility] Emergency Dept[facility]    Test Date:  2019-06-15  Pt Name:    BRI DANIELS               Department: ER  MRN:        [id number]                      Room:       [facility]  Gender:     Female                       Technician: [id number]  :        [id number]                   Requested By:GLORIA BRUNO  Order #:    [id number]                    Reading MD: Gloria Bruno MD    Measurements  Intervals                                Axis  Rate:       65                           P:          69  IN:         164                          QRS:        65  QRSD:       90                           T:          73  QT:         432  QTc:        450    Interpretive Statements  SINUS RHYTHM  No previous ECG available for comparison    Electronically Signed On 2019 0:52:47 PDT by Gloria Bruno MD         LABS  Pertinent Lab Findings    Labs Reviewed   CBC WITH DIFFERENTIAL - Abnormal; Notable for the following components:       Result Value    WBC 4.7 (*)     Platelet Count 139 (*)     MPV 8.9 (*)     All other components within normal limits   COMP  METABOLIC PANEL - Abnormal; Notable for the following components:    Glucose 112 (*)     Bun 26 (*)     All other components within normal limits   AMMONIA - Abnormal; Notable for the following components:    Ammonia 70 (*)     All other components within normal limits   POCT GLUCOSE DEVICE RESULTS - Abnormal; Notable for the following components:    POC Glucose, Blood 106 (*)     All other components within normal limits   DIAGNOSTIC ALCOHOL   ESTIMATED GFR       RADIOLOGY  No orders to display     The radiologist's interpretation of all radiological studies have been reviewed by me.      COURSE & MEDICAL DECISION MAKING  Pertinent Labs & Imaging studies reviewed. (See chart for details)    I reassessed patient after Benadryl she had a very mild brief period where she was calm and cooperative but then now became more agitated so she was given 2 of Ativan at this time.      Reassessed again after the Ativan helps a little bit longer.  Where she was less agitated but again I believe this is likely dystonic and she will be given Benadryl as well as blood analysis that is otherwise within normal limits beyond a mildly elevated ammonia at 70      Despite 2 doses of Benadryl and Ativan the patient is still to somewhat fidgety she is really sleepy now which is consistent with the medications but it seems as soon as she relaxes her muscles is kind of twitch or spasm.  She is not febrile so I doubt NMS I doubt serotonin syndrome I am unsure of the medication that she took as her new med the only medication she has in her bag is the Requip      2:50 AM  The patient was placed in ED observation status for altered mental status likely secondary to medication reaction unable to perform history     I verified that the patient was wearing a mask and I was wearing appropriate PPE every time I entered the room. The patient's mask was on the patient at all times during my encounter except for a brief view of the  oropharynx.          FINAL IMPRESSION  1. Altered mental status, unspecified altered mental status type     2. History of psychosis             Electronically signed by: Jessica Valencia M.D., 7/2/2022 12:41 AM    This dictation has been created using voice recognition software and/or scribes. The accuracy of the dictation is limited by the abilities of the software and the expertise of the scribes. I expect there may be some errors of grammar and possibly content. I made every attempt to manually correct the errors within my dictation. However, errors related to voice recognition software and/or scribes may still exist and should be interpreted within the appropriate context.

## 2022-07-02 NOTE — ASSESSMENT & PLAN NOTE
Possibly secondary to her new medication Doni in the setting of multiple sedating agents      Will slowly resume some of her home medications

## 2022-07-02 NOTE — DISCHARGE SUMMARY
"    ED Observation discharge summary    Date of Service: 07/02/22    Interval History  I assumed care of this patient at approximately 4 AM.  Patient was brought in around midnight with abnormal behavior.  She had altered mental status with fidgeting twitching hemiballismus.  She had just started a new medication.  The medication was unknown.  There was question of this could be a dystonic reaction neuroleptic malignant syndrome or other pathology.  Patient was treated by paramedics with 2 mg of Versed.  In the emergency department she got a total of 100 mg of Benadryl to treat dystonia.  Her mental status remained altered.  I assumed care.  Data was reviewed.  Additional studies were obtained.  No evidence of overdose.  Her elevated ammonia level was likely lab error.  She does not have any amphetamines or other illicit substances.  CT scan of the head does not show any acute abnormalities.  Working diagnosis ongoing altered mental status secondary to either overdose or adverse reaction to the new unknown medication.  After prolonged observation in the ER the patient was able to wake up enough to tell me that she just started Lybalvi-olanzapine/Samidorphan.  Its unclear if she overdosed, but certainly she still appears to be having extraparametal side effects of this after prolonged observation in the ER.  She does not have fever.  I do not see evidence of neuroleptic malignant syndrome.  She will need to be admitted to the hospital for further observation and additional treatment as indicated.  I consulted Dr. Hernandez.    Physical Exam  BP (!) 140/77   Pulse 82   Temp 36.7 °C (98.1 °F)   Resp 16   Ht 1.549 m (5' 0.98\")   Wt 71.6 kg (157 lb 13.6 oz)   LMP 05/12/2022 (Approximate)   SpO2 92%   BMI 29.84 kg/m² .    Constitutional: Sleeping arousable.  Intermittent severe twitching.  Slurred speech.  HENT:  Grossly normal  Eyes: Grossly normal  Neck: Normal range of motion  Cardiovascular: Normal heart rate "   Thorax & Lungs: No respiratory distress  Abdomen: Nontender  Skin:  No pathologic rash.   Extremities: Well perfused      Labs  Results for orders placed or performed during the hospital encounter of 07/02/22   CBC WITH DIFFERENTIAL   Result Value Ref Range    WBC 4.7 (L) 4.8 - 10.8 K/uL    RBC 4.43 4.20 - 5.40 M/uL    Hemoglobin 14.4 12.0 - 16.0 g/dL    Hematocrit 42.9 37.0 - 47.0 %    MCV 96.8 81.4 - 97.8 fL    MCH 32.5 27.0 - 33.0 pg    MCHC 33.6 33.6 - 35.0 g/dL    RDW 41.4 35.9 - 50.0 fL    Platelet Count 139 (L) 164 - 446 K/uL    MPV 8.9 (L) 9.0 - 12.9 fL    Neutrophils-Polys 65.70 44.00 - 72.00 %    Lymphocytes 25.40 22.00 - 41.00 %    Monocytes 7.70 0.00 - 13.40 %    Eosinophils 0.40 0.00 - 6.90 %    Basophils 0.40 0.00 - 1.80 %    Immature Granulocytes 0.40 0.00 - 0.90 %    Nucleated RBC 0.00 /100 WBC    Neutrophils (Absolute) 3.08 2.00 - 7.15 K/uL    Lymphs (Absolute) 1.19 1.00 - 4.80 K/uL    Monos (Absolute) 0.36 0.00 - 0.85 K/uL    Eos (Absolute) 0.02 0.00 - 0.51 K/uL    Baso (Absolute) 0.02 0.00 - 0.12 K/uL    Immature Granulocytes (abs) 0.02 0.00 - 0.11 K/uL    NRBC (Absolute) 0.00 K/uL   COMP METABOLIC PANEL   Result Value Ref Range    Sodium 141 135 - 145 mmol/L    Potassium 4.2 3.6 - 5.5 mmol/L    Chloride 107 96 - 112 mmol/L    Co2 22 20 - 33 mmol/L    Anion Gap 12.0 7.0 - 16.0    Glucose 112 (H) 65 - 99 mg/dL    Bun 26 (H) 8 - 22 mg/dL    Creatinine 0.86 0.50 - 1.40 mg/dL    Calcium 9.0 8.5 - 10.5 mg/dL    AST(SGOT) 14 12 - 45 U/L    ALT(SGPT) 9 2 - 50 U/L    Alkaline Phosphatase 80 30 - 99 U/L    Total Bilirubin 0.2 0.1 - 1.5 mg/dL    Albumin 4.0 3.2 - 4.9 g/dL    Total Protein 6.4 6.0 - 8.2 g/dL    Globulin 2.4 1.9 - 3.5 g/dL    A-G Ratio 1.7 g/dL   DIAGNOSTIC ALCOHOL   Result Value Ref Range    Diagnostic Alcohol <10.1 <10.1 mg/dL   AMMONIA   Result Value Ref Range    Ammonia 70 (H) 11 - 45 umol/L   ESTIMATED GFR   Result Value Ref Range    GFR (CKD-EPI) 80 >60 mL/min/1.73 m 2   URINE DRUG  SCREEN   Result Value Ref Range    Amphetamines Urine Negative Negative    Barbiturates Negative Negative    Benzodiazepines Positive (A) Negative    Cocaine Metabolite Negative Negative    Methadone Negative Negative    Opiates Negative Negative    Oxycodone Negative Negative    Phencyclidine -Pcp Negative Negative    Propoxyphene Negative Negative    Cannabinoid Metab Negative Negative   Salicylate   Result Value Ref Range    Salicylates, Quant. <1.0 (L) 15.0 - 25.0 mg/dL   ACETAMINOPHEN   Result Value Ref Range    Acetaminophen -Tylenol <5.0 (L) 10.0 - 30.0 ug/mL   DIAGNOSTIC ALCOHOL   Result Value Ref Range    Diagnostic Alcohol <10.1 <10.1 mg/dL   AMMONIA   Result Value Ref Range    Ammonia 25 11 - 45 umol/L   URINALYSIS    Specimen: Urine   Result Value Ref Range    Color Yellow     Character Cloudy (A)     Specific Gravity 1.015 <1.035    Ph 8.0 5.0 - 8.0    Glucose Negative Negative mg/dL    Ketones Negative Negative mg/dL    Protein Negative Negative mg/dL    Bilirubin Negative Negative    Urobilinogen, Urine 1.0 Negative    Nitrite Negative Negative    Leukocyte Esterase Negative Negative    Occult Blood Trace (A) Negative    Micro Urine Req Microscopic    CREATINE KINASE   Result Value Ref Range    CPK Total 121 0 - 154 U/L   URINE MICROSCOPIC (W/UA)   Result Value Ref Range    WBC 0-2 /hpf    RBC 5-10 (A) /hpf    Bacteria Negative None /hpf    Epithelial Cells Negative /hpf    Hyaline Cast 0-2 /lpf   POCT glucose device results   Result Value Ref Range    POC Glucose, Blood 106 (H) 65 - 99 mg/dL       Radiology  CT-HEAD W/O   Final Result         1.  No acute intracranial abnormality.   2.  Atherosclerosis.             Impression  1.  Altered mental status  2.  adverse reaction to medication  3.  Rule out overdose    Electronically signed by: Leroy Sommers M.D., 7/2/2022 9:50 AM

## 2022-07-02 NOTE — ED TRIAGE NOTES
"Chief Complaint   Patient presents with   • ALOC     PT called 911 for SOB after taking a new bipolar med.  REMSA while at PT's home PT got aggitated and was given 2 mg of versed. PT then saying her Fibromyalgia was acting up and then became even more agitated and was transported to the ED as an altered PT.     Blood Pressure (Abnormal) 154/77   Pulse 77   Temperature 36.5 °C (97.7 °F) (Temporal)   Respiration 16   Height 1.549 m (5' 0.98\")   Weight 71.6 kg (157 lb 13.6 oz)   Last Menstrual Period 05/12/2022 (Approximate)   Oxygen Saturation 96%   Body Mass Index 29.84 kg/m²     "

## 2022-07-02 NOTE — CARE PLAN
The patient is DROWSY    Shift Goals  Clinical Goals: monitor close  Patient Goals: SANJU    Progress made toward(s) clinical / shift goals:  Observe Neurostatus    Patient is not progressing towards the following goals:      Problem: Knowledge Deficit - Standard  Goal: Patient and family/care givers will demonstrate understanding of plan of care, disease process/condition, diagnostic tests and medications  7/2/2022 1646 by Brielle Acosta, R.N.  Outcome: Progressing  7/2/2022 1645 by Brielle Acosta, R.N.  Outcome: Progressing

## 2022-07-02 NOTE — ED NOTES
Pt's friend Viola called and left her phone number [phone], wasted to let the pt know she'll be taking care of her animals.

## 2022-07-02 NOTE — ED NOTES
Pharmacy Medication Reconciliation      ~Medication reconciliation updated and complete per patient home pharmacy  ~No oral ABX within the last 30 days  ~Patient home pharmacy: [facility]

## 2022-07-02 NOTE — ED NOTES
PT remains altered and only able to follow simple commands.  PT continues to kick blankets off and lay sideways in the bed.  PT with VSS

## 2022-07-02 NOTE — PROGRESS NOTES
Pt opening eyes to verbal now,answering questions but going back to sleep,occational generalized jerky movements ,abdomen distended.No respiratory distress.

## 2022-07-03 PROBLEM — R53.1 WEAKNESS: Status: ACTIVE | Noted: 2022-07-03

## 2022-07-03 LAB
ALBUMIN SERPL BCP-MCNC: 3.9 G/DL (ref 3.2–4.9)
ALBUMIN/GLOB SERPL: 1.7 G/DL
ALP SERPL-CCNC: 74 U/L (ref 30–99)
ALT SERPL-CCNC: 13 U/L (ref 2–50)
ANION GAP SERPL CALC-SCNC: 13 MMOL/L (ref 7–16)
AST SERPL-CCNC: 30 U/L (ref 12–45)
BASOPHILS # BLD AUTO: 0.2 % (ref 0–1.8)
BASOPHILS # BLD: 0.01 K/UL (ref 0–0.12)
BILIRUB SERPL-MCNC: 0.5 MG/DL (ref 0.1–1.5)
BUN SERPL-MCNC: 18 MG/DL (ref 8–22)
CALCIUM SERPL-MCNC: 8.9 MG/DL (ref 8.5–10.5)
CHLORIDE SERPL-SCNC: 110 MMOL/L (ref 96–112)
CO2 SERPL-SCNC: 21 MMOL/L (ref 20–33)
CREAT SERPL-MCNC: 0.75 MG/DL (ref 0.5–1.4)
EOSINOPHIL # BLD AUTO: 0 K/UL (ref 0–0.51)
EOSINOPHIL NFR BLD: 0 % (ref 0–6.9)
ERYTHROCYTE [DISTWIDTH] IN BLOOD BY AUTOMATED COUNT: 40.6 FL (ref 35.9–50)
GFR SERPLBLD CREATININE-BSD FMLA CKD-EPI: 94 ML/MIN/1.73 M 2
GLOBULIN SER CALC-MCNC: 2.3 G/DL (ref 1.9–3.5)
GLUCOSE SERPL-MCNC: 106 MG/DL (ref 65–99)
HCT VFR BLD AUTO: 43.5 % (ref 37–47)
HGB BLD-MCNC: 15.1 G/DL (ref 12–16)
IMM GRANULOCYTES # BLD AUTO: 0.02 K/UL (ref 0–0.11)
IMM GRANULOCYTES NFR BLD AUTO: 0.4 % (ref 0–0.9)
LYMPHOCYTES # BLD AUTO: 1.1 K/UL (ref 1–4.8)
LYMPHOCYTES NFR BLD: 21.4 % (ref 22–41)
MAGNESIUM SERPL-MCNC: 2 MG/DL (ref 1.5–2.5)
MCH RBC QN AUTO: 32.5 PG (ref 27–33)
MCHC RBC AUTO-ENTMCNC: 34.7 G/DL (ref 33.6–35)
MCV RBC AUTO: 93.5 FL (ref 81.4–97.8)
MONOCYTES # BLD AUTO: 0.4 K/UL (ref 0–0.85)
MONOCYTES NFR BLD AUTO: 7.8 % (ref 0–13.4)
NEUTROPHILS # BLD AUTO: 3.6 K/UL (ref 2–7.15)
NEUTROPHILS NFR BLD: 70.2 % (ref 44–72)
NRBC # BLD AUTO: 0 K/UL
NRBC BLD-RTO: 0 /100 WBC
PHOSPHATE SERPL-MCNC: 3 MG/DL (ref 2.5–4.5)
PLATELET # BLD AUTO: 140 K/UL (ref 164–446)
PMV BLD AUTO: 9.2 FL (ref 9–12.9)
POTASSIUM SERPL-SCNC: 3.1 MMOL/L (ref 3.6–5.5)
PROT SERPL-MCNC: 6.2 G/DL (ref 6–8.2)
RBC # BLD AUTO: 4.65 M/UL (ref 4.2–5.4)
SODIUM SERPL-SCNC: 144 MMOL/L (ref 135–145)
WBC # BLD AUTO: 5.1 K/UL (ref 4.8–10.8)

## 2022-07-03 PROCEDURE — 700111 HCHG RX REV CODE 636 W/ 250 OVERRIDE (IP): Performed by: HOSPITALIST

## 2022-07-03 PROCEDURE — 84100 ASSAY OF PHOSPHORUS: CPT

## 2022-07-03 PROCEDURE — 85025 COMPLETE CBC W/AUTO DIFF WBC: CPT

## 2022-07-03 PROCEDURE — 99225 PR SUBSEQUENT OBSERVATION CARE,LEVEL II: CPT | Performed by: HOSPITALIST

## 2022-07-03 PROCEDURE — G0378 HOSPITAL OBSERVATION PER HR: HCPCS

## 2022-07-03 PROCEDURE — A9270 NON-COVERED ITEM OR SERVICE: HCPCS | Performed by: HOSPITALIST

## 2022-07-03 PROCEDURE — 80053 COMPREHEN METABOLIC PANEL: CPT

## 2022-07-03 PROCEDURE — 83735 ASSAY OF MAGNESIUM: CPT

## 2022-07-03 PROCEDURE — 700102 HCHG RX REV CODE 250 W/ 637 OVERRIDE(OP): Performed by: HOSPITALIST

## 2022-07-03 PROCEDURE — 700105 HCHG RX REV CODE 258: Performed by: HOSPITALIST

## 2022-07-03 PROCEDURE — 97162 PT EVAL MOD COMPLEX 30 MIN: CPT

## 2022-07-03 PROCEDURE — 96372 THER/PROPH/DIAG INJ SC/IM: CPT

## 2022-07-03 RX ORDER — GABAPENTIN 300 MG/1
900 CAPSULE ORAL 3 TIMES DAILY
Status: DISCONTINUED | OUTPATIENT
Start: 2022-07-03 | End: 2022-07-04 | Stop reason: HOSPADM

## 2022-07-03 RX ORDER — ESCITALOPRAM OXALATE 10 MG/1
20 TABLET ORAL DAILY
Status: DISCONTINUED | OUTPATIENT
Start: 2022-07-03 | End: 2022-07-04 | Stop reason: HOSPADM

## 2022-07-03 RX ORDER — DIVALPROEX SODIUM 500 MG/1
1500 TABLET, DELAYED RELEASE ORAL
Status: DISCONTINUED | OUTPATIENT
Start: 2022-07-03 | End: 2022-07-04 | Stop reason: HOSPADM

## 2022-07-03 RX ORDER — BUPRENORPHINE HYDROCHLORIDE AND NALOXONE HYDROCHLORIDE DIHYDRATE 8; 2 MG/1; MG/1
1 TABLET SUBLINGUAL DAILY
Status: DISCONTINUED | OUTPATIENT
Start: 2022-07-03 | End: 2022-07-04 | Stop reason: HOSPADM

## 2022-07-03 RX ADMIN — GABAPENTIN 900 MG: 300 CAPSULE ORAL at 12:28

## 2022-07-03 RX ADMIN — ACETAMINOPHEN 650 MG: 325 TABLET, FILM COATED ORAL at 21:40

## 2022-07-03 RX ADMIN — GABAPENTIN 900 MG: 300 CAPSULE ORAL at 18:35

## 2022-07-03 RX ADMIN — SODIUM CHLORIDE: 9 INJECTION, SOLUTION INTRAVENOUS at 12:26

## 2022-07-03 RX ADMIN — ENOXAPARIN SODIUM 40 MG: 40 INJECTION SUBCUTANEOUS at 18:35

## 2022-07-03 RX ADMIN — BUPRENORPHINE HYDROCHLORIDE AND NALOXONE HYDROCHLORIDE DIHYDRATE 1 TABLET: 8; 2 TABLET SUBLINGUAL at 10:47

## 2022-07-03 RX ADMIN — SENNOSIDES AND DOCUSATE SODIUM 2 TABLET: 50; 8.6 TABLET ORAL at 18:35

## 2022-07-03 RX ADMIN — SENNOSIDES AND DOCUSATE SODIUM 2 TABLET: 50; 8.6 TABLET ORAL at 05:14

## 2022-07-03 RX ADMIN — ESCITALOPRAM OXALATE 20 MG: 10 TABLET ORAL at 10:47

## 2022-07-03 RX ADMIN — SODIUM CHLORIDE: 9 INJECTION, SOLUTION INTRAVENOUS at 00:38

## 2022-07-03 RX ADMIN — DIVALPROEX SODIUM 1500 MG: 500 TABLET, DELAYED RELEASE ORAL at 21:05

## 2022-07-03 ASSESSMENT — ENCOUNTER SYMPTOMS
DOUBLE VISION: 0
MYALGIAS: 1
CHILLS: 0
BACK PAIN: 1
PALPITATIONS: 0
TINGLING: 0
DIZZINESS: 0
FEVER: 0
NAUSEA: 0
CLAUDICATION: 0
SPUTUM PRODUCTION: 0
DEPRESSION: 0
HEMOPTYSIS: 0
HEARTBURN: 0
ABDOMINAL PAIN: 0
BLURRED VISION: 0
TREMORS: 0
WEIGHT LOSS: 0
HALLUCINATIONS: 0
VOMITING: 0
PHOTOPHOBIA: 0
HEADACHES: 0
COUGH: 0

## 2022-07-03 ASSESSMENT — PAIN DESCRIPTION - PAIN TYPE
TYPE: ACUTE PAIN

## 2022-07-03 ASSESSMENT — GAIT ASSESSMENTS
DISTANCE (FEET): 250
GAIT LEVEL OF ASSIST: SUPERVISED
DEVIATION: DECREASED BASE OF SUPPORT;BRADYKINETIC

## 2022-07-03 ASSESSMENT — COGNITIVE AND FUNCTIONAL STATUS - GENERAL
MOBILITY SCORE: 24
SUGGESTED CMS G CODE MODIFIER MOBILITY: CH

## 2022-07-03 ASSESSMENT — LIFESTYLE VARIABLES: SUBSTANCE_ABUSE: 0

## 2022-07-03 NOTE — PROGRESS NOTES
I have received report from day shift RN. I have assumed care of this patient. Patient is lethargic but easily arousable, she is A&Ox3, disoriented to time. VSS. Patient has been assessed (see flow sheet) and plan of care has been discussed. Patient verbalizes understanding of plan and denies any further needs at this time. Patient is now resting in bed, bed is in the lowest position and locked, bed alarm in place and on, and the call light is within reach.

## 2022-07-03 NOTE — PROGRESS NOTES
2 RN Skin Assessment Completed by , RN and , RN.     Head: WDL  Ears: WDL  Nose: WDL  Mouth: WDL  Neck: WDL  Breasts/Chest: WDL  Shoulder Blades: WDL  Spine: WDL  (R) Arm/Elbow/hand: WDL  (L) Arm/Elbow/hand: WDL  Abdomen:WDL  Groin: WDL  Sacrum/Coccyx/Buttocks: WDL  (R) Leg:red rashes,no open wound  (L) Leg:red rashes,no open wound  (R) Heel/Foot/Toe: WDL  (L) Heel/Foot/Toe: WDL              Devices in place: BP Cuff and Pulse Ox       Possible skin injury found: No     Pictures uploaded into Epic: N/A  Wound Consult Placed: N/A

## 2022-07-03 NOTE — PROGRESS NOTES
Hospital Medicine Daily Progress Note    Date of Service  7/3/2022    Chief Complaint  Jemima Piña is a 56 y.o. female admitted 7/2/2022 with change in mental status  Hospital Course  No notes on file    Interval Problem Update  Patient's mental status is slowly improving however she is not at her baseline.    Now that she is more awake she is complaining of pain in her extremities and back as her normal home medications have been held due to her change in mental status    Patient states she feels weak    I have discussed this patient's plan of care and discharge plan at IDT rounds today with Case Management, Nursing, Nursing leadership, and other members of the IDT team.    Consultants/Specialty      Code Status  Full Code    Disposition  Patient is not medically cleared for discharge.   Anticipate discharge to to home with close outpatient follow-up.  I have placed the appropriate orders for post-discharge needs.    Review of Systems  Review of Systems   Constitutional: Negative for chills, fever and weight loss.   HENT: Negative for ear discharge, hearing loss and tinnitus.    Eyes: Negative for blurred vision, double vision and photophobia.   Respiratory: Negative for cough, hemoptysis and sputum production.    Cardiovascular: Negative for chest pain, palpitations and claudication.   Gastrointestinal: Negative for abdominal pain, heartburn, nausea and vomiting.   Genitourinary: Negative for dysuria, frequency and urgency.   Musculoskeletal: Positive for back pain, joint pain and myalgias.   Neurological: Negative for dizziness, tingling, tremors and headaches.   Psychiatric/Behavioral: Negative for depression, hallucinations and substance abuse.        Physical Exam  Temp:  [36.6 °C (97.8 °F)-37.6 °C (99.7 °F)] 36.9 °C (98.4 °F)  Pulse:  [56-90] 56  Resp:  [16-24] 18  BP: (133-144)/(72-77) 139/75  SpO2:  [92 %-95 %] 92 %    Physical Exam  Constitutional:       General: She is not in acute distress.      Appearance: She is not ill-appearing, toxic-appearing or diaphoretic.   HENT:      Head: Normocephalic and atraumatic.   Eyes:      Extraocular Movements: Extraocular movements intact.      Pupils: Pupils are equal, round, and reactive to light.   Cardiovascular:      Rate and Rhythm: Normal rate and regular rhythm.      Pulses: Normal pulses.   Pulmonary:      Effort: Pulmonary effort is normal. No respiratory distress.      Breath sounds: Normal breath sounds. No wheezing or rales.   Abdominal:      General: Abdomen is flat. There is no distension.      Palpations: There is no mass.      Tenderness: There is no abdominal tenderness. There is no guarding or rebound.      Hernia: No hernia is present.   Musculoskeletal:         General: No swelling, tenderness, deformity or signs of injury. Normal range of motion.      Cervical back: Normal range of motion and neck supple.      Right lower leg: No edema.      Left lower leg: No edema.   Skin:     General: Skin is warm.      Capillary Refill: Capillary refill takes 2 to 3 seconds.      Coloration: Skin is not jaundiced or pale.      Findings: No erythema or lesion.   Neurological:      General: No focal deficit present.      Mental Status: She is oriented to person, place, and time.      Cranial Nerves: No cranial nerve deficit.      Motor: No weakness.   Psychiatric:         Mood and Affect: Affect is flat.         Fluids    Intake/Output Summary (Last 24 hours) at 7/3/2022 0943  Last data filed at 7/2/2022 2000  Gross per 24 hour   Intake --   Output 350 ml   Net -350 ml       Laboratory  Recent Labs     07/02/22 0208 07/03/22  0054   WBC 4.7* 5.1   RBC 4.43 4.65   HEMOGLOBIN 14.4 15.1   HEMATOCRIT 42.9 43.5   MCV 96.8 93.5   MCH 32.5 32.5   MCHC 33.6 34.7   RDW 41.4 40.6   PLATELETCT 139* 140*   MPV 8.9* 9.2     Recent Labs     07/02/22 0208 07/03/22  0054   SODIUM 141 144   POTASSIUM 4.2 3.1*   CHLORIDE 107 110   CO2 22 21   GLUCOSE 112* 106*   BUN 26* 18    CREATININE 0.86 0.75   CALCIUM 9.0 8.9                   Imaging  CT-HEAD W/O   Final Result         1.  No acute intracranial abnormality.   2.  Atherosclerosis.              Assessment/Plan  * Acute encephalopathy- (present on admission)  Assessment & Plan  Possibly secondary to her new medication Libalvi in the setting of multiple sedating agents      Will slowly resume some of her home medications        Weakness- (present on admission)  Assessment & Plan  PT and OT eval    Fibromyalgia- (present on admission)  Assessment & Plan  With chronic narcotic dependence patient maintained on Suboxone last prescription filled on 6/8/2022 per review of PDMP    Resume home medications except for lybalvi and insomnia medications    Bipolar I disorder, most recent episode depressed (HCC)- (present on admission)  Assessment & Plan  Hold lybalvi          VTE prophylaxis: SCDs/TEDs    I have performed a physical exam and reviewed and updated ROS and Plan today (7/3/2022). In review of yesterday's note (7/2/2022), there are no changes except as documented above.

## 2022-07-03 NOTE — PROGRESS NOTES
Patient mental status improving, she is drowsy but respond to voice and is able to participate in activity and conversation. She is A&Ox4. Discussed with on call hospitalist, bed side swallow eval completed by this RN per hospitalist and patient passed bedside swallow eval.

## 2022-07-03 NOTE — CARE PLAN
"The patient is Watcher - Medium risk of patient condition declining or worsening    Shift Goals  Clinical Goals: asp precautions, monitor mental status  Patient Goals: safety, \"feel normal\"  Family Goals: n/a    Progress made toward(s) clinical / shift goals:    Problem: Knowledge Deficit - Standard  Goal: Patient and family/care givers will demonstrate understanding of plan of care, disease process/condition, diagnostic tests and medications  7/2/2022 2340 by Earline Mathias R.N.  Outcome: Progressing  7/2/2022 2339 by Earline Mathias R.N.  Outcome: Progressing     Problem: Skin Integrity  Goal: Skin integrity is maintained or improved  7/2/2022 2340 by Earline Mathias R.N.  Outcome: Progressing  7/2/2022 2339 by Earline Mathias R.N.  Outcome: Progressing     Problem: Neuro Status  Goal: Neuro status will remain stable or improve  7/2/2022 2340 by Earline Mathias R.N.  Outcome: Progressing  7/2/2022 2339 by Earline Mathias R.N.  Outcome: Progressing     Problem: Self Care  Goal: Patient will have the ability to perform ADLs independently or with assistance (bathe, groom, dress, toilet and feed)  7/2/2022 2340 by Earline Mathias R.N.  Outcome: Progressing  7/2/2022 2339 by Earline Mathias R.N.  Outcome: Progressing     Problem: Risk for Aspiration  Goal: Patient's risk for aspiration will be absent or decrease  7/2/2022 2340 by Earline Mathias R.N.  Outcome: Progressing  7/2/2022 2339 by Earline Mathias R.N.  Outcome: Progressing     Problem: Urinary Elimination  Goal: Establish and maintain regular urinary output  7/2/2022 2340 by Earline Mathias R.N.  Outcome: Progressing  7/2/2022 2339 by Earline Mathias R.N.  Outcome: Progressing     Problem: Bowel Elimination  Goal: Establish and maintain regular bowel function  7/2/2022 2340 by Earline Mathias R.N.  Outcome: Progressing  7/2/2022 2339 by Earline Mathias R.N.  Outcome: Progressing     Problem: Respiratory  Goal: Patient will " achieve/maintain optimum respiratory ventilation and gas exchange  7/2/2022 2340 by Earline Mathias RJEROME.  Outcome: Progressing  7/2/2022 2339 by Earline Mathias R.N.  Outcome: Progressing     Problem: Mobility  Goal: Patient's capacity to carry out activities will improve  7/2/2022 2340 by Earline Mathias R.N.  Outcome: Progressing  7/2/2022 2339 by Earline Mathias R.N.  Outcome: Progressing       Patient is not progressing towards the following goals:

## 2022-07-03 NOTE — DISCHARGE PLANNING
[facility] TCN chart review completed. Collaborated with CEDRIC Bhandari prior to meeting with the pt. The most current review of medical record, knowledge of pt's PLOF and social support, LACE+ score of 57 were considered. 6 clicks scores not yet present.     Pt seen at bedside. Introduced TCN program. Pt endorses living alone, with social support from a friend Viola who is currently watching her dog. Pt states she does not drive, but gets rides from [facility]. She is normally ambulatory in the community without an AD and feels she is near her functional baseline. PT/OT evaluations are pending.  Discussed HTH/SCP plan benefits (Meds to Beds, medical uber and [facility] transitional care). Pt verbalizes understanding. Given aforementioned, no choice obtained at this time. Amenable to [facility]- referral placed. States she is worried about her financial obligation for this admission. Believes she has Medicaid secondary (though is not showing on her facesheet). LSW notified and pt also referred to the [facility] CP team.     TCN will continue to follow and collaborate with the discharge planning team as indicated.

## 2022-07-03 NOTE — PROGRESS NOTES
Assumed care from Earline HULL  Assessment complete. Plan of care gone over with patient and all concerns were gone over with patient. Patient was resting in bed comfortably. Patient was A&O x 4. Safety precautions in place. Patient had no concerns at this time and educated on how to use the call light. Will continue to do hourly monitoring.

## 2022-07-03 NOTE — PROGRESS NOTES
Patient awake asking to use bathroom, patient up to bed side commode with hand held assistance only, patient maintains steady gait and proper arousal for completion of activity.

## 2022-07-03 NOTE — DISCHARGE PLANNING
MILOW left  for [employer] requesting they run patient's account for medicaid coverage.    1400: CEDRIC received a call from Pily with [facility] who reports that patient has active medicaid [facility] coverage which covers medicare premiums.

## 2022-07-04 VITALS
HEART RATE: 66 BPM | WEIGHT: 159.39 LBS | HEIGHT: 62 IN | OXYGEN SATURATION: 89 % | TEMPERATURE: 97.9 F | BODY MASS INDEX: 29.33 KG/M2 | DIASTOLIC BLOOD PRESSURE: 75 MMHG | SYSTOLIC BLOOD PRESSURE: 123 MMHG | RESPIRATION RATE: 15 BRPM

## 2022-07-04 PROBLEM — R53.1 WEAKNESS: Status: RESOLVED | Noted: 2022-07-03 | Resolved: 2022-07-04

## 2022-07-04 PROBLEM — G93.40 ACUTE ENCEPHALOPATHY: Status: RESOLVED | Noted: 2022-07-02 | Resolved: 2022-07-04

## 2022-07-04 PROCEDURE — G0378 HOSPITAL OBSERVATION PER HR: HCPCS

## 2022-07-04 PROCEDURE — 99217 PR OBSERVATION CARE DISCHARGE: CPT | Performed by: HOSPITALIST

## 2022-07-04 PROCEDURE — 700102 HCHG RX REV CODE 250 W/ 637 OVERRIDE(OP): Performed by: HOSPITALIST

## 2022-07-04 PROCEDURE — A9270 NON-COVERED ITEM OR SERVICE: HCPCS | Performed by: HOSPITALIST

## 2022-07-04 PROCEDURE — 97165 OT EVAL LOW COMPLEX 30 MIN: CPT

## 2022-07-04 PROCEDURE — 700111 HCHG RX REV CODE 636 W/ 250 OVERRIDE (IP): Performed by: HOSPITALIST

## 2022-07-04 RX ADMIN — GABAPENTIN 900 MG: 300 CAPSULE ORAL at 05:05

## 2022-07-04 RX ADMIN — ESCITALOPRAM OXALATE 20 MG: 10 TABLET ORAL at 05:04

## 2022-07-04 RX ADMIN — BUPRENORPHINE HYDROCHLORIDE AND NALOXONE HYDROCHLORIDE DIHYDRATE 1 TABLET: 8; 2 TABLET SUBLINGUAL at 05:05

## 2022-07-04 RX ADMIN — SENNOSIDES AND DOCUSATE SODIUM 2 TABLET: 50; 8.6 TABLET ORAL at 05:05

## 2022-07-04 ASSESSMENT — COGNITIVE AND FUNCTIONAL STATUS - GENERAL
DAILY ACTIVITIY SCORE: 24
SUGGESTED CMS G CODE MODIFIER DAILY ACTIVITY: CH

## 2022-07-04 ASSESSMENT — PAIN DESCRIPTION - PAIN TYPE: TYPE: ACUTE PAIN

## 2022-07-04 ASSESSMENT — ACTIVITIES OF DAILY LIVING (ADL): TOILETING: INDEPENDENT

## 2022-07-04 NOTE — PROGRESS NOTES
I have received report from day shift RN. I have assumed care of this patient. She is A&&Ox4, VSS. Patient has been assessed (see flow sheet) and plan of care has been discussed. Patient verbalizes understanding of plan and denies any further needs at this time. Patient is now resting in bed, bed is in the lowest position and locked, and the call light is within reach.

## 2022-07-04 NOTE — CARE PLAN
The patient is Stable - Low risk of patient condition declining or worsening    Shift Goals  Clinical Goals: monitor mental status, d/c planning  Patient Goals: safety  Family Goals: n/a    Progress made toward(s) clinical / shift goals:    Problem: Knowledge Deficit - Standard  Goal: Patient and family/care givers will demonstrate understanding of plan of care, disease process/condition, diagnostic tests and medications  Outcome: Progressing     Problem: Skin Integrity  Goal: Skin integrity is maintained or improved  Outcome: Progressing     Problem: Self Care  Goal: Patient will have the ability to perform ADLs independently or with assistance (bathe, groom, dress, toilet and feed)  Outcome: Progressing     Problem: Urinary Elimination  Goal: Establish and maintain regular urinary output  Outcome: Progressing     Problem: Bowel Elimination  Goal: Establish and maintain regular bowel function  Outcome: Progressing     Problem: Mobility  Goal: Patient's capacity to carry out activities will improve  Outcome: Progressing     Problem: Pain - Standard  Goal: Alleviation of pain or a reduction in pain to the patient’s comfort goal  Outcome: Progressing       Patient is not progressing towards the following goals:

## 2022-07-04 NOTE — THERAPY
Physical Therapy   Initial Evaluation     Patient Name: Jemima Piña  Age:  56 y.o., Sex:  female  Medical Record #: [id number]  Today's Date: 7/3/2022     Precautions  Precautions: Fall Risk;Swallow Precautions ( See Comments)    Assessment  Patient is 56 y.o. female presenting for SOB, acute encephalopathy, and weakness in the setting of initiating a new medication w/ a PMH of bipolar disorder and fibromyalgia w/ chronic narcotic dependence.  She lives alone in a 2nd story apt w/ elevator access, and reports a friend is available to assist her w/ ADL's/IADL's if needed, however she was previously independent (see flowsheet for home set up and PLOF).  Currently, the pt presents w/ no deficits in functional mobility at near-baseline independence.  The pt performed bed mobility spv hob flat and no rails, STS eob w/ no AD spv, and gait 250' using no AD spv via narrow ADAM and slow becky, no LOB observed and distance limited by therapist.  Recommend pt dc home w/ no further PT needs anticipated given near-baseline mobility currently demo'd.  Will not follow, please re consult should there be a change in the pt's condition.      Plan    Recommend Physical Therapy for Evaluation only     DC Equipment Recommendations: None  Discharge Recommendations: Anticipate that the patient will have no further physical therapy needs after discharge from the hospital       Subjective/Objective       07/03/22 1543   Prior Living Situation   Prior Services Home-Independent   Housing / Facility 1 Story Apartment / Condo  (2nd level)   Steps Into Home   (FOS; elevator access)   Steps In Home 0   Equipment Owned Single Point Cane   Lives with - Patient's Self Care Capacity Alone and Able to Care For Self   Comments pt lives alone independent, however has a friend available to assist w/ ADL's/IADL's if needed (currently taking care of her dog)   Prior Level of Functional Mobility   Bed Mobility Independent   Transfer Status Independent    Ambulation Independent   Distance Ambulation (Feet)   (community distances)   Assistive Devices Used None   Stairs Independent   History of Falls   History of Falls No   Date of Last Fall   (pt denies any recent falls)   Cognition    Cognition / Consciousness WDL   Level of Consciousness Alert   Comments pt pleasant and cooperative   Passive ROM Lower Body   Passive ROM Lower Body WDL   Active ROM Lower Body    Active ROM Lower Body  WDL   Comments observed during functional mobility   Strength Lower Body   Lower Body Strength  WDL   Comments as above   Sensation Lower Body   Lower Extremity Sensation   WDL   Comments pt denies numbness/tingling in LE's   Coordination Lower Body    Coordination Lower Body  WDL   Balance Assessment   Sitting Balance (Static) Good   Sitting Balance (Dynamic) Good   Standing Balance (Static) Fair +   Standing Balance (Dynamic) Fair   Weight Shift Sitting Good   Weight Shift Standing Good   Comments stand w/ no AD   Gait Analysis   Gait Level Of Assist Supervised   Assistive Device None   Distance (Feet) 250   # of Times Distance was Traveled 1   Deviation Decreased Base Of Support;Bradykinetic   Weight Bearing Status no restrictions   Vision Deficits Impacting Mobility denies   Comments distance limited by therapist   Bed Mobility    Supine to Sit Supervised   Sit to Supine Supervised   Scooting Supervised   Comments hob flat, no rails   Functional Mobility   Sit to Stand Supervised   Bed, Chair, Wheelchair Transfer Supervised   Transfer Method Stand Step   Mobility STS eob w/ no AD   Activity Tolerance   Sitting Edge of Bed 5min   Standing 15min   Edema / Skin Assessment   Edema / Skin  Not Assessed   Education Group   Education Provided Role of Physical Therapist   Role of Physical Therapist Patient Response Patient;Acceptance;Explanation;Demonstration;Action Demonstration   Additional Comments pt receptive of [facility] provided   Problem List    Problems None   Interdisciplinary Plan of  Care Collaboration   IDT Collaboration with  Nursing   Patient Position at End of Therapy In Bed;Call Light within Reach;Tray Table within Reach;Phone within Reach   Collaboration Comments regarding outcome of tx session   Session Information   Date / Session Number  7/3- eval only

## 2022-07-04 NOTE — THERAPY
"Occupational Therapy   Initial Evaluation     Patient Name: Jemima Piña  Age:  56 y.o., Sex:  female  Medical Record #: [id number]  Today's Date: 7/4/2022     Precautions  Precautions: Fall Risk, Swallow Precautions ( See Comments)  Comments: Patient reports that she was awaiting an outpatient MRI ordered by neurologist as she has had multiple recent falls where her legs give out on her    Assessment  Patient is 56 y.o. female with a diagnosis of ALOC and SOB after taking a new medication.  Additional factors influencing patient status / progress: Patient is doing well today, she mobilizes grossly at supv level and is supv for all ADL. Patient is independent at baseline, consistent with performance today. No further acute OT indicated.      Plan    Recommend Occupational Therapy for Evaluation only.       Discharge Recommendations: Anticipate that the patient will have no further occupational therapy needs after discharge from the hospital     Subjective    \"This new medicine made it hard to breathe and I called Viola and she told me to call 911.\"     Objective       07/04/22 0857   Prior Living Situation   Prior Services Home-Independent   Housing / Facility 1 Story Apartment / Condo   Equipment Owned Single Point Cane   Lives with - Patient's Self Care Capacity Alone and Able to Care For Self   Comments Patient lives alone, her heightbor Viola helps with driving and takes her grocery shopping   Prior Level of ADL Function   Self Feeding Independent   Grooming / Hygiene Independent   Bathing Independent   Dressing Independent   Toileting Independent   Prior Level of IADL Function   Medication Management Independent   Laundry Independent   Kitchen Mobility Independent   Finances Independent   Home Management Independent   Shopping Requires Assist   Prior Level Of Mobility Independent Without Device in Community;Independent Without Device in Home   Driving / Transportation Relatives / Others Provide " Transportation   Cognition    Cognition / Consciousness WDL   Level of Consciousness Alert   Comments Pleasant and cooperative   Active ROM Upper Body   Active ROM Upper Body  WDL   Balance Assessment   Sitting Balance (Static) Good   Sitting Balance (Dynamic) Good   Standing Balance (Static) Fair +   Standing Balance (Dynamic) Fair   Weight Shift Sitting Good   Weight Shift Standing Good   Comments No AD   Bed Mobility    Supine to Sit Supervised   Sit to Supine Supervised   ADL Assessment   Eating Modified Independent   Lower Body Dressing Supervision   Toileting Supervision   Functional Mobility   Sit to Stand Supervised   Bed, Chair, Wheelchair Transfer Supervised   Toilet Transfers Standby Assist   Transfer Method Stand Step   Mobility sup><sit, STS, toilet xfer   Activity Tolerance   Sitting in Chair 3 mins on toilet   Sitting Edge of Bed 5 mins   Standing 7 mins

## 2022-07-04 NOTE — PROGRESS NOTES
Patient discharged home with friend. All personal belongings collected. IV access removed. Discharge instructions discussed. Medications reviewed. Follow up appointments discussed. Patient discharged from discharge lounge without incident.

## 2022-07-04 NOTE — DISCHARGE SUMMARY
Discharge Summary    CHIEF COMPLAINT ON ADMISSION  Chief Complaint   Patient presents with   • ALOC     PT called 911 for SOB after taking a new bipolar med.  REMSA while at PT's home PT got aggitated and was given 2 mg of versed. PT then saying her Fibromyalgia was acting up and then became even more agitated and was transported to the ED as an altered PT.       Reason for Admission  EMS     Admission Date  7/2/2022    CODE STATUS  Prior    HPI & HOSPITAL COURSE  As per dr lula rivas h+p    Jemima Piña is a 56 y.o. female who presented 7/2/2022 with agitation.  Patient is unable to provide any history at the time of my examination history was obtained from review of available records.  The patient apparently called 911 because she was not feeling well after taking a new bipolar medication Lybalvi reports she took only 1 dose.  When the paramedics arrived she was agitated and received Versed and transferred to the emergency department where she was noted to have some twitching movement of all her extremities and intermittent agitation and had received 50 mg of Benadryl x2 and lorazepam 2 mg in the emergency department.  Apparently earlier today she was able to tell her nurse name of the medication she took.  Initial ammonia level was 70 however repeat ammonia level was normal.  CT of the head was negative for acute pathology.  Urine drug screen was only positive for benzodiazepines as expected as she had received Versed prior to transfer.  She is currently on room air and is protecting her airway    Patient was hospitalized given hydration.  We held all of her medications initially.  Once patient mental status improved we slowly resumed all of her medications except for lybalvi     We monitored the patient for stability and patient was stabilized and cleared for discharge on 7/4/2022    Patient did not continue lybalvi and to follow-up with the medical professional who prescribed that medication for possible  alternatives  Therefore, she is discharged in good and stable condition to home with close outpatient follow-up.    The patient recovered much more quickly than anticipated on admission.    Discharge Date  7/4/2022    FOLLOW UP ITEMS POST DISCHARGE      DISCHARGE DIAGNOSES  Principal Problem (Resolved):    Acute encephalopathy POA: Yes  Active Problems:    Bipolar I disorder, most recent episode depressed (HCC) POA: Yes      Overview: ICD-10 transition    Fibromyalgia POA: Yes  Resolved Problems:    Weakness POA: Yes      FOLLOW UP  Future Appointments   Date Time Provider Department Center   7/21/2022  2:10 PM [facility] [address] [place] [address]     Vidhya Uribe P.A.-C.  [address] [id number] [phone]    Call  For hospital follow up    Ulises Razo D.O.  [address] [id number] [phone]            MEDICATIONS ON DISCHARGE     Medication List      CONTINUE taking these medications      Instructions   Belsomra 20 MG Tabs  Generic drug: Suvorexant   Take 20 mg by mouth at bedtime.  Dose: 20 mg     Buprenorphine HCl-Naloxone HCl 8-2 MG Film   Place 1.5 Film under the tongue every day.  Dose: 1.5 Film     divalproex 500 MG Tbec  Commonly known as: DEPAKOTE   Take 1,500 mg by mouth at bedtime.  Dose: 1,500 mg     escitalopram 20 MG tablet  Commonly known as: LEXAPRO   Take 20 mg by mouth every day.  Dose: 20 mg     gabapentin 300 MG Caps  Commonly known as: NEURONTIN   Take 900 mg by mouth 3 times a day.  Dose: 900 mg        STOP taking these medications    Lybalvi 20-10 MG Tabs  Generic drug: OLANZapine-Samidorphan            Allergies  Allergies   Allergen Reactions   • Aminophylline      Original entry read AMNOPHYLIN      • Asa [Aspirin]    • Cyclacillin      Original entry read cyclines   • Flu Virus Vaccine    • Lybalvi [Olanzapine-Samidorphan]      Altered mental status and aggitation   • Pcn [Penicillins]    • Reglan [Metoclopramide Hcl]    • Trazodone       Increased anxiety       DIET  No orders of the defined types were placed in this encounter.      ACTIVITY  As tolerated.  Weight bearing as tolerated    CONSULTATIONS      PROCEDURES      LABORATORY  Lab Results   Component Value Date    SODIUM 144 07/03/2022    POTASSIUM 3.1 (L) 07/03/2022    CHLORIDE 110 07/03/2022    CO2 21 07/03/2022    GLUCOSE 106 (H) 07/03/2022    BUN 18 07/03/2022    CREATININE 0.75 07/03/2022        Lab Results   Component Value Date    WBC 5.1 07/03/2022    HEMOGLOBIN 15.1 07/03/2022    HEMATOCRIT 43.5 07/03/2022    PLATELETCT 140 (L) 07/03/2022        Total time of the discharge process exceeds 37 minutes.

## 2022-07-04 NOTE — DISCHARGE INSTRUCTIONS
Discharge Instructions    Discharged to home by car with escort. Discharged via wheelchair, hospital escort: Yes.  Special equipment needed: Not Applicable    Be sure to schedule a follow-up appointment with your primary care doctor or any specialists as instructed.     Discharge Plan:        I understand that a diet low in cholesterol, fat, and sodium is recommended for good health. Unless I have been given specific instructions below for another diet, I accept this instruction as my diet prescription.   Other diet:     Special Instructions: None    -Is this patient being discharged with medication to prevent blood clots?  No    Is patient discharged on Warfarin / Coumadin?   No

## 2022-07-21 ENCOUNTER — HOSPITAL ENCOUNTER (OUTPATIENT)
Dept: RADIOLOGY | Facility: MEDICAL CENTER | Age: 57
End: 2022-07-21
Attending: STUDENT IN AN ORGANIZED HEALTH CARE EDUCATION/TRAINING PROGRAM
Payer: MEDICARE

## 2022-07-21 DIAGNOSIS — Z12.31 VISIT FOR SCREENING MAMMOGRAM: ICD-10-CM

## 2022-07-21 PROCEDURE — 77063 BREAST TOMOSYNTHESIS BI: CPT

## 2022-08-28 ENCOUNTER — HOSPITAL ENCOUNTER (OUTPATIENT)
Dept: RADIOLOGY | Facility: MEDICAL CENTER | Age: 57
End: 2022-08-28
Attending: STUDENT IN AN ORGANIZED HEALTH CARE EDUCATION/TRAINING PROGRAM
Payer: MEDICARE

## 2022-08-28 DIAGNOSIS — R26.89 SCISSOR GAIT: ICD-10-CM

## 2022-08-28 PROCEDURE — 700117 HCHG RX CONTRAST REV CODE 255: Performed by: STUDENT IN AN ORGANIZED HEALTH CARE EDUCATION/TRAINING PROGRAM

## 2022-08-28 PROCEDURE — 70553 MRI BRAIN STEM W/O & W/DYE: CPT

## 2022-08-28 PROCEDURE — A9576 INJ PROHANCE MULTIPACK: HCPCS | Performed by: STUDENT IN AN ORGANIZED HEALTH CARE EDUCATION/TRAINING PROGRAM

## 2022-08-28 RX ADMIN — GADOTERIDOL 15 ML: 279.3 INJECTION, SOLUTION INTRAVENOUS at 14:37

## 2022-08-31 ENCOUNTER — HOSPITAL ENCOUNTER (OUTPATIENT)
Facility: MEDICAL CENTER | Age: 57
End: 2022-08-31
Attending: STUDENT IN AN ORGANIZED HEALTH CARE EDUCATION/TRAINING PROGRAM
Payer: MEDICARE

## 2022-08-31 PROCEDURE — 87186 SC STD MICRODIL/AGAR DIL: CPT

## 2022-08-31 PROCEDURE — 87077 CULTURE AEROBIC IDENTIFY: CPT

## 2022-08-31 PROCEDURE — 87086 URINE CULTURE/COLONY COUNT: CPT

## 2022-08-31 PROCEDURE — 81001 URINALYSIS AUTO W/SCOPE: CPT

## 2022-09-01 LAB
APPEARANCE UR: ABNORMAL
BACTERIA #/AREA URNS HPF: ABNORMAL /HPF
BILIRUB UR QL STRIP.AUTO: NEGATIVE
COLOR UR: YELLOW
EPI CELLS #/AREA URNS HPF: NEGATIVE /HPF
GLUCOSE UR STRIP.AUTO-MCNC: NEGATIVE MG/DL
KETONES UR STRIP.AUTO-MCNC: NEGATIVE MG/DL
LEUKOCYTE ESTERASE UR QL STRIP.AUTO: ABNORMAL
MICRO URNS: ABNORMAL
NITRITE UR QL STRIP.AUTO: POSITIVE
PH UR STRIP.AUTO: 6 [PH] (ref 5–8)
PROT UR QL STRIP: 100 MG/DL
RBC # URNS HPF: ABNORMAL /HPF
RBC UR QL AUTO: ABNORMAL
SP GR UR STRIP.AUTO: 1.02
UROBILINOGEN UR STRIP.AUTO-MCNC: 1 MG/DL
WBC #/AREA URNS HPF: ABNORMAL /HPF

## 2022-09-03 ENCOUNTER — HOSPITAL ENCOUNTER (INPATIENT)
Facility: MEDICAL CENTER | Age: 57
LOS: 4 days | DRG: 871 | End: 2022-09-07
Attending: EMERGENCY MEDICINE | Admitting: STUDENT IN AN ORGANIZED HEALTH CARE EDUCATION/TRAINING PROGRAM
Payer: MEDICARE

## 2022-09-03 ENCOUNTER — APPOINTMENT (OUTPATIENT)
Dept: RADIOLOGY | Facility: MEDICAL CENTER | Age: 57
DRG: 871 | End: 2022-09-03
Attending: EMERGENCY MEDICINE
Payer: MEDICARE

## 2022-09-03 DIAGNOSIS — R78.81 GRAM-NEGATIVE BACTEREMIA: ICD-10-CM

## 2022-09-03 DIAGNOSIS — N12 PYELONEPHRITIS: ICD-10-CM

## 2022-09-03 DIAGNOSIS — R26.81 UNSTEADY GAIT: ICD-10-CM

## 2022-09-03 PROBLEM — E87.6 HYPOKALEMIA: Status: ACTIVE | Noted: 2022-09-03

## 2022-09-03 PROBLEM — R94.31 PROLONGED Q-T INTERVAL ON ECG: Status: ACTIVE | Noted: 2022-09-03

## 2022-09-03 PROBLEM — R74.01 TRANSAMINITIS: Status: ACTIVE | Noted: 2022-09-03

## 2022-09-03 PROBLEM — R41.0 DELIRIUM: Status: ACTIVE | Noted: 2022-09-03

## 2022-09-03 PROBLEM — A41.9 SEPSIS (HCC): Status: ACTIVE | Noted: 2022-09-03

## 2022-09-03 PROBLEM — N30.01 ACUTE CYSTITIS WITH HEMATURIA: Status: ACTIVE | Noted: 2022-09-03

## 2022-09-03 LAB
ALBUMIN SERPL BCP-MCNC: 2.9 G/DL (ref 3.2–4.9)
ALBUMIN/GLOB SERPL: 1 G/DL
ALP SERPL-CCNC: 127 U/L (ref 30–99)
ALT SERPL-CCNC: 62 U/L (ref 2–50)
AMPHET UR QL SCN: NEGATIVE
ANION GAP SERPL CALC-SCNC: 10 MMOL/L (ref 7–16)
APPEARANCE UR: ABNORMAL
AST SERPL-CCNC: 102 U/L (ref 12–45)
BACTERIA #/AREA URNS HPF: ABNORMAL /HPF
BACTERIA UR CULT: ABNORMAL
BACTERIA UR CULT: ABNORMAL
BARBITURATES UR QL SCN: NEGATIVE
BASOPHILS # BLD AUTO: 0.3 % (ref 0–1.8)
BASOPHILS # BLD: 0.02 K/UL (ref 0–0.12)
BENZODIAZ UR QL SCN: NEGATIVE
BILIRUB SERPL-MCNC: 0.8 MG/DL (ref 0.1–1.5)
BILIRUB UR QL STRIP.AUTO: NEGATIVE
BUN SERPL-MCNC: 15 MG/DL (ref 8–22)
BZE UR QL SCN: NEGATIVE
CALCIUM SERPL-MCNC: 8.4 MG/DL (ref 8.5–10.5)
CANNABINOIDS UR QL SCN: NEGATIVE
CHLORIDE SERPL-SCNC: 99 MMOL/L (ref 96–112)
CO2 SERPL-SCNC: 25 MMOL/L (ref 20–33)
COLOR UR: YELLOW
CREAT SERPL-MCNC: 0.87 MG/DL (ref 0.5–1.4)
EKG IMPRESSION: NORMAL
EOSINOPHIL # BLD AUTO: 0 K/UL (ref 0–0.51)
EOSINOPHIL NFR BLD: 0 % (ref 0–6.9)
EPI CELLS #/AREA URNS HPF: NEGATIVE /HPF
ERYTHROCYTE [DISTWIDTH] IN BLOOD BY AUTOMATED COUNT: 39.7 FL (ref 35.9–50)
ETHANOL BLD-MCNC: <10.1 MG/DL
FLUAV RNA SPEC QL NAA+PROBE: NEGATIVE
FLUBV RNA SPEC QL NAA+PROBE: NEGATIVE
GFR SERPLBLD CREATININE-BSD FMLA CKD-EPI: 79 ML/MIN/1.73 M 2
GLOBULIN SER CALC-MCNC: 2.8 G/DL (ref 1.9–3.5)
GLUCOSE SERPL-MCNC: 136 MG/DL (ref 65–99)
GLUCOSE UR STRIP.AUTO-MCNC: NEGATIVE MG/DL
HCT VFR BLD AUTO: 34.3 % (ref 37–47)
HGB BLD-MCNC: 11.8 G/DL (ref 12–16)
HYALINE CASTS #/AREA URNS LPF: ABNORMAL /LPF
IMM GRANULOCYTES # BLD AUTO: 0.03 K/UL (ref 0–0.11)
IMM GRANULOCYTES NFR BLD AUTO: 0.4 % (ref 0–0.9)
KETONES UR STRIP.AUTO-MCNC: NEGATIVE MG/DL
LACTATE SERPL-SCNC: 1.1 MMOL/L (ref 0.5–2)
LEUKOCYTE ESTERASE UR QL STRIP.AUTO: ABNORMAL
LYMPHOCYTES # BLD AUTO: 0.61 K/UL (ref 1–4.8)
LYMPHOCYTES NFR BLD: 8.4 % (ref 22–41)
MCH RBC QN AUTO: 32.4 PG (ref 27–33)
MCHC RBC AUTO-ENTMCNC: 34.4 G/DL (ref 33.6–35)
MCV RBC AUTO: 94.2 FL (ref 81.4–97.8)
METHADONE UR QL SCN: NEGATIVE
MICRO URNS: ABNORMAL
MONOCYTES # BLD AUTO: 0.97 K/UL (ref 0–0.85)
MONOCYTES NFR BLD AUTO: 13.3 % (ref 0–13.4)
NEUTROPHILS # BLD AUTO: 5.66 K/UL (ref 2–7.15)
NEUTROPHILS NFR BLD: 77.6 % (ref 44–72)
NITRITE UR QL STRIP.AUTO: NEGATIVE
NRBC # BLD AUTO: 0 K/UL
NRBC BLD-RTO: 0 /100 WBC
OPIATES UR QL SCN: NEGATIVE
OXYCODONE UR QL SCN: NEGATIVE
PCP UR QL SCN: NEGATIVE
PH UR STRIP.AUTO: 6 [PH] (ref 5–8)
PLATELET # BLD AUTO: 105 K/UL (ref 164–446)
PMV BLD AUTO: 8.9 FL (ref 9–12.9)
POTASSIUM SERPL-SCNC: 3.2 MMOL/L (ref 3.6–5.5)
PROCALCITONIN SERPL-MCNC: 0.2 NG/ML
PROPOXYPH UR QL SCN: NEGATIVE
PROT SERPL-MCNC: 5.7 G/DL (ref 6–8.2)
PROT UR QL STRIP: 100 MG/DL
RBC # BLD AUTO: 3.64 M/UL (ref 4.2–5.4)
RBC # URNS HPF: ABNORMAL /HPF
RBC UR QL AUTO: ABNORMAL
RSV RNA SPEC QL NAA+PROBE: NEGATIVE
SARS-COV-2 RNA RESP QL NAA+PROBE: NOTDETECTED
SIGNIFICANT IND 70042: ABNORMAL
SITE SITE: ABNORMAL
SODIUM SERPL-SCNC: 134 MMOL/L (ref 135–145)
SOURCE SOURCE: ABNORMAL
SP GR UR STRIP.AUTO: 1.01
SPECIMEN SOURCE: NORMAL
UROBILINOGEN UR STRIP.AUTO-MCNC: 2 MG/DL
WBC # BLD AUTO: 7.3 K/UL (ref 4.8–10.8)
WBC #/AREA URNS HPF: ABNORMAL /HPF

## 2022-09-03 PROCEDURE — 82077 ASSAY SPEC XCP UR&BREATH IA: CPT

## 2022-09-03 PROCEDURE — 85025 COMPLETE CBC W/AUTO DIFF WBC: CPT

## 2022-09-03 PROCEDURE — 93005 ELECTROCARDIOGRAM TRACING: CPT

## 2022-09-03 PROCEDURE — 87040 BLOOD CULTURE FOR BACTERIA: CPT | Mod: 91

## 2022-09-03 PROCEDURE — 0241U HCHG SARS-COV-2 COVID-19 NFCT DS RESP RNA 4 TRGT MIC: CPT

## 2022-09-03 PROCEDURE — 700105 HCHG RX REV CODE 258: Performed by: EMERGENCY MEDICINE

## 2022-09-03 PROCEDURE — 96374 THER/PROPH/DIAG INJ IV PUSH: CPT

## 2022-09-03 PROCEDURE — 36415 COLL VENOUS BLD VENIPUNCTURE: CPT

## 2022-09-03 PROCEDURE — 80307 DRUG TEST PRSMV CHEM ANLYZR: CPT

## 2022-09-03 PROCEDURE — 770001 HCHG ROOM/CARE - MED/SURG/GYN PRIV*

## 2022-09-03 PROCEDURE — 93005 ELECTROCARDIOGRAM TRACING: CPT | Performed by: EMERGENCY MEDICINE

## 2022-09-03 PROCEDURE — 700105 HCHG RX REV CODE 258: Performed by: STUDENT IN AN ORGANIZED HEALTH CARE EDUCATION/TRAINING PROGRAM

## 2022-09-03 PROCEDURE — 83605 ASSAY OF LACTIC ACID: CPT

## 2022-09-03 PROCEDURE — 87186 SC STD MICRODIL/AGAR DIL: CPT | Mod: 91

## 2022-09-03 PROCEDURE — 700111 HCHG RX REV CODE 636 W/ 250 OVERRIDE (IP): Performed by: STUDENT IN AN ORGANIZED HEALTH CARE EDUCATION/TRAINING PROGRAM

## 2022-09-03 PROCEDURE — 99285 EMERGENCY DEPT VISIT HI MDM: CPT

## 2022-09-03 PROCEDURE — 87077 CULTURE AEROBIC IDENTIFY: CPT | Mod: 91

## 2022-09-03 PROCEDURE — 71045 X-RAY EXAM CHEST 1 VIEW: CPT

## 2022-09-03 PROCEDURE — 99223 1ST HOSP IP/OBS HIGH 75: CPT | Mod: AI | Performed by: STUDENT IN AN ORGANIZED HEALTH CARE EDUCATION/TRAINING PROGRAM

## 2022-09-03 PROCEDURE — 80053 COMPREHEN METABOLIC PANEL: CPT

## 2022-09-03 PROCEDURE — 700102 HCHG RX REV CODE 250 W/ 637 OVERRIDE(OP): Performed by: STUDENT IN AN ORGANIZED HEALTH CARE EDUCATION/TRAINING PROGRAM

## 2022-09-03 PROCEDURE — 700111 HCHG RX REV CODE 636 W/ 250 OVERRIDE (IP): Performed by: EMERGENCY MEDICINE

## 2022-09-03 PROCEDURE — A9270 NON-COVERED ITEM OR SERVICE: HCPCS | Performed by: STUDENT IN AN ORGANIZED HEALTH CARE EDUCATION/TRAINING PROGRAM

## 2022-09-03 PROCEDURE — 84145 PROCALCITONIN (PCT): CPT

## 2022-09-03 PROCEDURE — C9803 HOPD COVID-19 SPEC COLLECT: HCPCS | Performed by: EMERGENCY MEDICINE

## 2022-09-03 PROCEDURE — 87086 URINE CULTURE/COLONY COUNT: CPT

## 2022-09-03 PROCEDURE — 81001 URINALYSIS AUTO W/SCOPE: CPT

## 2022-09-03 RX ORDER — ROPINIROLE 0.5 MG/1
0.5 TABLET, FILM COATED ORAL 2 TIMES DAILY
Status: DISCONTINUED | OUTPATIENT
Start: 2022-09-03 | End: 2022-09-07 | Stop reason: HOSPADM

## 2022-09-03 RX ORDER — QUETIAPINE 400 MG/1
400 TABLET, FILM COATED, EXTENDED RELEASE ORAL NIGHTLY
COMMUNITY

## 2022-09-03 RX ORDER — LEVOTHYROXINE SODIUM 0.05 MG/1
50 TABLET ORAL
Status: DISCONTINUED | OUTPATIENT
Start: 2022-09-04 | End: 2022-09-07 | Stop reason: HOSPADM

## 2022-09-03 RX ORDER — LEVOTHYROXINE SODIUM 0.05 MG/1
50 TABLET ORAL
COMMUNITY

## 2022-09-03 RX ORDER — POTASSIUM CHLORIDE 20 MEQ/1
40 TABLET, EXTENDED RELEASE ORAL ONCE
Status: COMPLETED | OUTPATIENT
Start: 2022-09-03 | End: 2022-09-03

## 2022-09-03 RX ORDER — AMOXICILLIN 250 MG
2 CAPSULE ORAL 2 TIMES DAILY
Status: DISCONTINUED | OUTPATIENT
Start: 2022-09-04 | End: 2022-09-07 | Stop reason: HOSPADM

## 2022-09-03 RX ORDER — DIVALPROEX SODIUM 500 MG/1
1500 TABLET, DELAYED RELEASE ORAL
Status: DISCONTINUED | OUTPATIENT
Start: 2022-09-03 | End: 2022-09-07 | Stop reason: HOSPADM

## 2022-09-03 RX ORDER — ESCITALOPRAM OXALATE 10 MG/1
20 TABLET ORAL DAILY
Status: DISCONTINUED | OUTPATIENT
Start: 2022-09-04 | End: 2022-09-07 | Stop reason: HOSPADM

## 2022-09-03 RX ORDER — SODIUM CHLORIDE, SODIUM LACTATE, POTASSIUM CHLORIDE, CALCIUM CHLORIDE 600; 310; 30; 20 MG/100ML; MG/100ML; MG/100ML; MG/100ML
INJECTION, SOLUTION INTRAVENOUS CONTINUOUS
Status: DISCONTINUED | OUTPATIENT
Start: 2022-09-03 | End: 2022-09-04

## 2022-09-03 RX ORDER — GABAPENTIN 300 MG/1
900 CAPSULE ORAL EVERY 8 HOURS
Status: DISCONTINUED | OUTPATIENT
Start: 2022-09-03 | End: 2022-09-07 | Stop reason: HOSPADM

## 2022-09-03 RX ORDER — ROPINIROLE 0.5 MG/1
0.5 TABLET, FILM COATED ORAL 2 TIMES DAILY
COMMUNITY

## 2022-09-03 RX ORDER — SODIUM CHLORIDE 9 MG/ML
INJECTION, SOLUTION INTRAVENOUS CONTINUOUS
Status: DISCONTINUED | OUTPATIENT
Start: 2022-09-03 | End: 2022-09-03

## 2022-09-03 RX ORDER — ACETAMINOPHEN 325 MG/1
975 TABLET ORAL ONCE
Status: DISCONTINUED | OUTPATIENT
Start: 2022-09-03 | End: 2022-09-04

## 2022-09-03 RX ORDER — BUPRENORPHINE HYDROCHLORIDE AND NALOXONE HYDROCHLORIDE DIHYDRATE 8; 2 MG/1; MG/1
1.5 TABLET SUBLINGUAL DAILY
Status: DISCONTINUED | OUTPATIENT
Start: 2022-09-04 | End: 2022-09-07 | Stop reason: HOSPADM

## 2022-09-03 RX ORDER — SODIUM CHLORIDE, SODIUM LACTATE, POTASSIUM CHLORIDE, AND CALCIUM CHLORIDE .6; .31; .03; .02 G/100ML; G/100ML; G/100ML; G/100ML
1000 INJECTION, SOLUTION INTRAVENOUS ONCE
Status: DISCONTINUED | OUTPATIENT
Start: 2022-09-03 | End: 2022-09-04

## 2022-09-03 RX ORDER — QUETIAPINE FUMARATE 100 MG/1
200 TABLET, FILM COATED ORAL NIGHTLY
Status: DISCONTINUED | OUTPATIENT
Start: 2022-09-03 | End: 2022-09-03

## 2022-09-03 RX ORDER — NALOXONE HYDROCHLORIDE 4 MG/.1ML
1 SPRAY NASAL ONCE
COMMUNITY

## 2022-09-03 RX ORDER — ENOXAPARIN SODIUM 100 MG/ML
40 INJECTION SUBCUTANEOUS DAILY
Status: DISCONTINUED | OUTPATIENT
Start: 2022-09-03 | End: 2022-09-07 | Stop reason: HOSPADM

## 2022-09-03 RX ORDER — QUETIAPINE FUMARATE 100 MG/1
100 TABLET, FILM COATED ORAL NIGHTLY
Status: DISCONTINUED | OUTPATIENT
Start: 2022-09-03 | End: 2022-09-05

## 2022-09-03 RX ORDER — ACETAMINOPHEN 325 MG/1
650 TABLET ORAL EVERY 6 HOURS PRN
Status: DISCONTINUED | OUTPATIENT
Start: 2022-09-03 | End: 2022-09-07 | Stop reason: HOSPADM

## 2022-09-03 RX ORDER — ONDANSETRON 2 MG/ML
4 INJECTION INTRAMUSCULAR; INTRAVENOUS ONCE
Status: DISCONTINUED | OUTPATIENT
Start: 2022-09-03 | End: 2022-09-03

## 2022-09-03 RX ORDER — POLYETHYLENE GLYCOL 3350 17 G/17G
1 POWDER, FOR SOLUTION ORAL
Status: DISCONTINUED | OUTPATIENT
Start: 2022-09-03 | End: 2022-09-07 | Stop reason: HOSPADM

## 2022-09-03 RX ORDER — CEFTRIAXONE 2 G/1
2 INJECTION, POWDER, FOR SOLUTION INTRAMUSCULAR; INTRAVENOUS ONCE
Status: COMPLETED | OUTPATIENT
Start: 2022-09-03 | End: 2022-09-03

## 2022-09-03 RX ORDER — SODIUM CHLORIDE, SODIUM LACTATE, POTASSIUM CHLORIDE, CALCIUM CHLORIDE 600; 310; 30; 20 MG/100ML; MG/100ML; MG/100ML; MG/100ML
1000 INJECTION, SOLUTION INTRAVENOUS ONCE
Status: COMPLETED | OUTPATIENT
Start: 2022-09-03 | End: 2022-09-03

## 2022-09-03 RX ORDER — BISACODYL 10 MG
10 SUPPOSITORY, RECTAL RECTAL
Status: DISCONTINUED | OUTPATIENT
Start: 2022-09-03 | End: 2022-09-07 | Stop reason: HOSPADM

## 2022-09-03 RX ADMIN — POTASSIUM CHLORIDE 40 MEQ: 1500 TABLET, EXTENDED RELEASE ORAL at 18:12

## 2022-09-03 RX ADMIN — SODIUM CHLORIDE, POTASSIUM CHLORIDE, SODIUM LACTATE AND CALCIUM CHLORIDE: 600; 310; 30; 20 INJECTION, SOLUTION INTRAVENOUS at 16:27

## 2022-09-03 RX ADMIN — ROPINIROLE HYDROCHLORIDE 0.5 MG: 0.5 TABLET, FILM COATED ORAL at 18:12

## 2022-09-03 RX ADMIN — DIVALPROEX SODIUM 1500 MG: 500 TABLET, DELAYED RELEASE ORAL at 21:08

## 2022-09-03 RX ADMIN — ACETAMINOPHEN 650 MG: 325 TABLET ORAL at 18:16

## 2022-09-03 RX ADMIN — SODIUM CHLORIDE, POTASSIUM CHLORIDE, SODIUM LACTATE AND CALCIUM CHLORIDE 1000 ML: 600; 310; 30; 20 INJECTION, SOLUTION INTRAVENOUS at 13:15

## 2022-09-03 RX ADMIN — GABAPENTIN 900 MG: 300 CAPSULE ORAL at 21:07

## 2022-09-03 RX ADMIN — ENOXAPARIN SODIUM 40 MG: 40 INJECTION SUBCUTANEOUS at 18:12

## 2022-09-03 RX ADMIN — CEFTRIAXONE SODIUM 2 G: 2 INJECTION, POWDER, FOR SOLUTION INTRAMUSCULAR; INTRAVENOUS at 15:13

## 2022-09-03 RX ADMIN — QUETIAPINE FUMARATE 100 MG: 100 TABLET ORAL at 21:07

## 2022-09-03 ASSESSMENT — LIFESTYLE VARIABLES
DOES PATIENT WANT TO STOP DRINKING: NO
EVER FELT BAD OR GUILTY ABOUT YOUR DRINKING: NO
HAVE YOU EVER FELT YOU SHOULD CUT DOWN ON YOUR DRINKING: NO
ALCOHOL_USE: NO
CONSUMPTION TOTAL: NEGATIVE
TOTAL SCORE: 0
SUBSTANCE_ABUSE: 0
ALCOHOL_USE: NO
ON A TYPICAL DAY WHEN YOU DRINK ALCOHOL HOW MANY DRINKS DO YOU HAVE: 0
TOTAL SCORE: 0
TOTAL SCORE: 0
AVERAGE NUMBER OF DAYS PER WEEK YOU HAVE A DRINK CONTAINING ALCOHOL: 0
TOTAL SCORE: 0
EVER HAD A DRINK FIRST THING IN THE MORNING TO STEADY YOUR NERVES TO GET RID OF A HANGOVER: NO
HAVE PEOPLE ANNOYED YOU BY CRITICIZING YOUR DRINKING: NO
EVER FELT BAD OR GUILTY ABOUT YOUR DRINKING: NO
EVER FELT BAD OR GUILTY ABOUT YOUR DRINKING: NO
DO YOU DRINK ALCOHOL: NO
HAVE PEOPLE ANNOYED YOU BY CRITICIZING YOUR DRINKING: NO
HOW MANY TIMES IN THE PAST YEAR HAVE YOU HAD 5 OR MORE DRINKS IN A DAY: 0
EVER HAD A DRINK FIRST THING IN THE MORNING TO STEADY YOUR NERVES TO GET RID OF A HANGOVER: NO
TOTAL SCORE: 0
TOTAL SCORE: 0
HAVE YOU EVER FELT YOU SHOULD CUT DOWN ON YOUR DRINKING: NO
EVER HAD A DRINK FIRST THING IN THE MORNING TO STEADY YOUR NERVES TO GET RID OF A HANGOVER: NO
HAVE PEOPLE ANNOYED YOU BY CRITICIZING YOUR DRINKING: NO
TOTAL SCORE: 0
TOTAL SCORE: 0
CONSUMPTION TOTAL: INCOMPLETE
CONSUMPTION TOTAL: INCOMPLETE
DOES PATIENT WANT TO STOP DRINKING: NO
TOTAL SCORE: 0
HAVE YOU EVER FELT YOU SHOULD CUT DOWN ON YOUR DRINKING: NO

## 2022-09-03 ASSESSMENT — ENCOUNTER SYMPTOMS
ABDOMINAL PAIN: 0
DOUBLE VISION: 0
FEVER: 1
BACK PAIN: 1
NERVOUS/ANXIOUS: 0
HEADACHES: 1
SENSORY CHANGE: 0
BLURRED VISION: 0
NECK PAIN: 1
DIARRHEA: 0
VOMITING: 0
MEMORY LOSS: 0
FLANK PAIN: 0
CHILLS: 1
SORE THROAT: 0
FOCAL WEAKNESS: 0
DIZZINESS: 0
DEPRESSION: 1
SPEECH CHANGE: 0
SHORTNESS OF BREATH: 0
MYALGIAS: 1
NAUSEA: 0
INSOMNIA: 1
COUGH: 0

## 2022-09-03 ASSESSMENT — FIBROSIS 4 INDEX
FIB4 SCORE: 3.27
FIB4 SCORE: 6.79

## 2022-09-03 ASSESSMENT — COGNITIVE AND FUNCTIONAL STATUS - GENERAL
MOBILITY SCORE: 15
WALKING IN HOSPITAL ROOM: A LOT
MOVING FROM LYING ON BACK TO SITTING ON SIDE OF FLAT BED: A LOT
HELP NEEDED FOR BATHING: A LOT
DRESSING REGULAR UPPER BODY CLOTHING: A LITTLE
SUGGESTED CMS G CODE MODIFIER MOBILITY: CK
DAILY ACTIVITIY SCORE: 18
TOILETING: A LOT
SUGGESTED CMS G CODE MODIFIER DAILY ACTIVITY: CK
CLIMB 3 TO 5 STEPS WITH RAILING: A LOT
DRESSING REGULAR LOWER BODY CLOTHING: A LITTLE
MOVING TO AND FROM BED TO CHAIR: A LITTLE
STANDING UP FROM CHAIR USING ARMS: A LOT

## 2022-09-03 ASSESSMENT — PATIENT HEALTH QUESTIONNAIRE - PHQ9
SUM OF ALL RESPONSES TO PHQ9 QUESTIONS 1 AND 2: 0
1. LITTLE INTEREST OR PLEASURE IN DOING THINGS: NOT AT ALL
2. FEELING DOWN, DEPRESSED, IRRITABLE, OR HOPELESS: NOT AT ALL

## 2022-09-03 ASSESSMENT — PAIN DESCRIPTION - PAIN TYPE: TYPE: ACUTE PAIN

## 2022-09-03 NOTE — ED NOTES
Pt admitted to hospital, vss, report to receiving RN, all belongings and chart sent with patient, no apparent distress, pt transported upstairs with transport.

## 2022-09-03 NOTE — H&P
Hospital Medicine History & Physical Note    Date of Service  9/3/2022    Primary Care Physician  Vidhya Uribe P.A.-C.    Consultants  NA    Specialist Names: NA    Code Status  Full Code    Chief Complaint  Chief Complaint   Patient presents with    ALOC     Pt presents for ALOC and incontinence. Pt was found sitting outside her complex by her mailman confused. Pt alert and oriented x1.        History of Presenting Illness  Jemima Piña is a 57 y.o. female with past medical history of hypothyroidism, bipolar disorder type I, fibromyalgia, history of breast cancer who presented 9/3/2022 after her mailman saw her and felt that she was altered and confused and then brought to ER.  On evaluation she complains of 1-1/2 weeks of feeling unwell with urinary incontinence and urgency as well as general malaise, fever and headache.  She also notes some neck and back pain that is worse with movement but denies neck stiffness.  She denies nausea, vomiting or abdominal pain.    On arrival patient is febrile with a temperature of 101.3, , RR 21, /72 saturating 89% on room air.  Labs are significant for normal WBC with left shift, H&H of 11.8/34.3, sodium 134, potassium 3.2, glucose 136, , ALT 62, albumin 2.9.  Lactic acid is normal at 1.1  UA is cloudy with moderate blood, large leukocyte esterase packed WBC, 20-50 RBC and many bacteria.  Procalcitonin is negative.  Patient was started on Rocephin given 1 L of IV fluids and will be admitted for further treatment of sepsis secondary to urinary tract infection.        I discussed the plan of care with patient.    Review of Systems  Review of Systems   Constitutional:  Positive for chills, fever and malaise/fatigue.   HENT:  Negative for congestion and sore throat.    Eyes:  Negative for blurred vision and double vision.   Respiratory:  Negative for cough and shortness of breath.    Cardiovascular:  Negative for chest pain and leg swelling.    Gastrointestinal:  Negative for abdominal pain, diarrhea, nausea and vomiting.   Genitourinary:  Positive for frequency and urgency. Negative for dysuria and flank pain.        Urinary incontinence   Musculoskeletal:  Positive for back pain, joint pain, myalgias and neck pain.   Skin:  Negative for rash.   Neurological:  Positive for headaches. Negative for dizziness, sensory change, speech change and focal weakness.   Psychiatric/Behavioral:  Positive for depression. Negative for memory loss, substance abuse and suicidal ideas. The patient has insomnia. The patient is not nervous/anxious.      Past Medical History   has a past medical history of Anxiety state, unspecified, Bipolar I disorder, most recent episode (or current) depressed, unspecified, Breast cancer (HCC) (2010), Chronic fatigue syndrome, CKD (chronic kidney disease) stage 2, GFR 60-89 ml/min, Congenital hypertrophic pyloric stenosis, Depression, Edema, Endometriosis, site unspecified, Hemorrhage of rectum and anus, Myalgia and myositis, unspecified, Other and unspecified ovarian cyst, Peptic ulcer, unspecified site, unspecified as acute or chronic, without mention of hemorrhage, perforation, or obstruction, Temporomandibular joint disorders, unspecified, Tobacco use disorder, Unspecified hypothyroidism, and Vaginal candidiasis (10/9/12).    Surgical History   has a past surgical history that includes tonsillectomy (1981); cholecystectomy (1991); thyroid lobectomy (1999); arthroscopy, knee; tubal coagulation laparoscopic bilateral (1994); and lumpectomy.     Family History  family history includes Asthma in her child; Cancer in her maternal grandfather and paternal grandfather; Clotting Disorder in her father; Lung Disease in her child; Other in her mother and sister; Psychiatric Illness in her mother and sister.   Family history reviewed with patient. There is no family history that is pertinent to the chief complaint.     Social History   reports  that she quit smoking about 5 years ago. Her smoking use included cigarettes. She has never used smokeless tobacco. She reports that she does not drink alcohol and does not use drugs.    Allergies  Allergies   Allergen Reactions    Aminophylline      Original entry read AMNOPHYLIN       Asa [Aspirin]     Cyclacillin      Original entry read cyclines    Flu Virus Vaccine     Lybalvi [Olanzapine-Samidorphan]      Altered mental status and aggitation    Pcn [Penicillins]      Historically tolerates cephalosporins     Reglan [Metoclopramide Hcl]     Trazodone      Increased anxiety       Medications  Prior to Admission Medications   Prescriptions Last Dose Informant Patient Reported? Taking?   Buprenorphine HCl-Naloxone HCl 8-2 MG FILM unknown at unknown Patient's Home Pharmacy Yes No   Sig: Place 1.5 Film under the tongue every day.   Naloxone (NARCAN) 4 MG/0.1ML Liquid unknown at unknown Patient's Home Pharmacy Yes Yes   Sig: Administer 1 Spray into affected nostril(S) one time.   ROPINIRole (REQUIP) 0.5 MG Tab unknown at unknown Patient's Home Pharmacy Yes Yes   Sig: Take 0.5 mg by mouth 2 times a day.   Suvorexant (BELSOMRA) 20 MG Tab unknown at unknown Patient's Home Pharmacy Yes No   Sig: Take 20 mg by mouth at bedtime.   divalproex (DEPAKOTE) 500 MG Tablet Delayed Response unknown at unknown Patient's Home Pharmacy Yes No   Sig: Take 1,500 mg by mouth at bedtime.   escitalopram (LEXAPRO) 20 MG tablet unknown at unknown Patient's Home Pharmacy Yes No   Sig: Take 20 mg by mouth every day.   gabapentin (NEURONTIN) 300 MG Cap unknown at unknown Patient's Home Pharmacy Yes No   Sig: Take 900 mg by mouth 3 times a day.   levothyroxine (SYNTHROID) 50 MCG Tab unknown at unknown Patient's Home Pharmacy Yes Yes   Sig: Take 50 mcg by mouth every morning on an empty stomach.   quetiapine (SEROQUEL XR) 400 MG XR tablet unknown at unknown Patient's Home Pharmacy Yes Yes   Sig: Take 400 mg by mouth every evening.       Facility-Administered Medications: None       Physical Exam  Temp:  [37.3 °C (99.2 °F)-38.5 °C (101.3 °F)] 37.3 °C (99.2 °F)  Pulse:  [] 80  Resp:  [17-25] 25  BP: (119-130)/(67-73) 119/73  SpO2:  [89 %-96 %] 96 %  Blood Pressure: 119/67   Temperature: 37.3 °C (99.2 °F)   Pulse: 66   Respiration: 17   Pulse Oximetry: 95 %       Physical Exam  Vitals and nursing note reviewed.   Constitutional:       General: She is not in acute distress.     Appearance: She is obese. She is ill-appearing. She is not toxic-appearing.   HENT:      Head: Normocephalic and atraumatic.      Mouth/Throat:      Mouth: Mucous membranes are dry.   Eyes:      Extraocular Movements: Extraocular movements intact.      Conjunctiva/sclera: Conjunctivae normal.      Comments: Patient has chronic right eye exotropia, she wears corrective lenses   Cardiovascular:      Rate and Rhythm: Regular rhythm. Tachycardia present.      Heart sounds: No murmur heard.  Pulmonary:      Effort: Pulmonary effort is normal.      Breath sounds: Normal breath sounds.   Abdominal:      General: Bowel sounds are normal. There is no distension.      Palpations: Abdomen is soft.      Tenderness: There is no abdominal tenderness. There is no right CVA tenderness, left CVA tenderness, guarding or rebound.   Musculoskeletal:         General: Normal range of motion.      Cervical back: Normal range of motion and neck supple. No rigidity.      Right lower leg: No edema.      Left lower leg: No edema.      Comments: No spinal tenderness noted on exam   Lymphadenopathy:      Cervical: No cervical adenopathy.   Skin:     General: Skin is warm.   Neurological:      General: No focal deficit present.      Mental Status: She is alert and oriented to person, place, and time. Mental status is at baseline.   Psychiatric:         Mood and Affect: Mood normal.         Behavior: Behavior normal.         Thought Content: Thought content normal.         Judgment: Judgment normal.        Laboratory:  Recent Labs     09/03/22  1240   WBC 7.3   RBC 3.64*   HEMOGLOBIN 11.8*   HEMATOCRIT 34.3*   MCV 94.2   MCH 32.4   MCHC 34.4   RDW 39.7   PLATELETCT 105*   MPV 8.9*     Recent Labs     09/03/22  1240   SODIUM 134*   POTASSIUM 3.2*   CHLORIDE 99   CO2 25   GLUCOSE 136*   BUN 15   CREATININE 0.87   CALCIUM 8.4*     Recent Labs     09/03/22  1240   ALTSGPT 62*   ASTSGOT 102*   ALKPHOSPHAT 127*   TBILIRUBIN 0.8   GLUCOSE 136*         No results for input(s): NTPROBNP in the last 72 hours.      No results for input(s): TROPONINT in the last 72 hours.    Imaging:  DX-CHEST-PORTABLE (1 VIEW)   Final Result      Hypoinflation with LEFT lung base infiltrate or atelectasis.          EKG:  I have personally reviewed the images and compared with prior images.    Assessment/Plan:  Justification for Admission Status  I anticipate this patient will require at least two midnights for appropriate medical management, necessitating inpatient admission because patient is admitted with sepsis secondary to UTI she will be started on broad-spectrum antibiotics which will be tailored secondary to urinary culture results which can take 24 to 48 hours.  She requires follow-up on blood cultures patient serum new back and neck pain to ensure that she does not have bacteremia.    Patient will need a Med/Surg bed on MEDICAL service .  The need is secondary to treatment for sepsis secondary to UTI patient does not require telemetry..    * Sepsis (HCC)- (present on admission)  Assessment & Plan  This is Sepsis Present on admission  SIRS criteria identified on my evaluation include: Fever, with temperature greater than 101 deg F, Tachycardia, with heart rate greater than 90 BPM and Tachypnea, with respirations greater than 20 per minute  Source is urinary tract infection  Sepsis protocol initiated  Fluid resuscitation ordered per protocol  Crystalloid Fluid Administration: Fluid resuscitation ordered per standard protocol - 30  mL/kg per current or ideal body weight  IV antibiotics as appropriate for source of sepsis  Reassessment: I have reassessed the patient's hemodynamic status    Patient started on Rocephin we will continue  Blood cultures ordered and pending   Follow-up with blood cultures and urinary cultures and de-escalate antibiotics as able  Supportive care with IV fluids and Tylenol      Acute cystitis with hematuria- (present on admission)  Assessment & Plan  1 week of urinary symptoms  UA positive  Follow-up urine and blood cultures  Continue Rocephin, de-escalate pending culture results  IV fluids    Transaminitis- (present on admission)  Assessment & Plan  Elevated AST//62, unclear etiology possibly secondary to sepsis versus underlying EtOH use she also has some anemia and thrombocytopenia and low albumin  -Check BAL and UDS  -Continue to monitor and trend, may need further evaluation    Hypokalemia- (present on admission)  Assessment & Plan  Potassium 3.2  Replace and monitor    Delirium- (present on admission)  Assessment & Plan  Patient noted to be altered and thus brought to the ER for evaluation however on exam she is completely alert and oriented and able to provide full medical history.  I suspect that her altered mentation may have been secondary to fever, however she also does have some labs that may suggest EtOH  We will check BAL and UDS  Continue to monitor closely  Treat infection as above  Tylenol for fever    Hypothyroidism- (present on admission)  Assessment & Plan  Chronic  Continue home Synthroid  Most recent labs 6 months ago were at goal    Bipolar I disorder, most recent episode depressed (HCC)- (present on admission)  Assessment & Plan  Patient has a history of bipolar 1 disorder with anxiety and depression she states that this is chronic and stable  Continue home medications however reduce the dose of Seroquel secondary to a prolonged QT  Monitor    Prolonged Q-T interval on ECG- (present on  admission)  Assessment & Plan  Patient is a prolonged QT C of 546 on EKG  She is on multiple QT prolonging medications including Lexapro and Seroquel  Continue Lexapro, decrease Seroquel from 400 to 100 mg nightly  Avoid QTC prolonging medications  Will repeat EKG tomorrow to reevaluate      VTE prophylaxis: enoxaparin ppx

## 2022-09-03 NOTE — ASSESSMENT & PLAN NOTE
1 week of urinary symptoms, + CVA tenderness L>R  UA positive  Follow-up urine and blood cultures  Continue Rocephin, de-escalate pending culture results  IV fluids

## 2022-09-03 NOTE — ASSESSMENT & PLAN NOTE
Elevated AST//62, unclear etiology possibly secondary to sepsis versus underlying EtOH use she also has some anemia and thrombocytopenia and low albumin  -Check BAL and UDS  -Continue to monitor and trend, may need further evaluation

## 2022-09-03 NOTE — ED TRIAGE NOTES
Chief Complaint   Patient presents with    ALOC     Pt presents for ALOC and incontinence. Pt was found sitting outside her complex by her mailman confused. Pt alert and oriented x1.

## 2022-09-03 NOTE — ASSESSMENT & PLAN NOTE
Patient noted to be altered and thus brought to the ER for evaluation however on exam she is completely alert and oriented and able to provide full medical history.  I suspect that her altered mentation may have been secondary to fever, however she also does have some labs that may suggest EtOH  We will check BAL and UDS  Continue to monitor closely  Treat infection as above  Tylenol for fever

## 2022-09-03 NOTE — ASSESSMENT & PLAN NOTE
Patient has a history of bipolar 1 disorder with anxiety and depression she states that this is chronic and stable  Continue home medications however reduce the dose of Seroquel secondary to a prolonged QT  Monitor

## 2022-09-03 NOTE — ASSESSMENT & PLAN NOTE
This is Sepsis Present on admission  SIRS criteria identified on my evaluation include: Fever, with temperature greater than 101 deg F, Tachycardia, with heart rate greater than 90 BPM and Tachypnea, with respirations greater than 20 per minute  Source is urinary tract infection  Sepsis protocol initiated  Fluid resuscitation ordered per protocol  Crystalloid Fluid Administration: Fluid resuscitation ordered per standard protocol - 30 mL/kg per current or ideal body weight  IV antibiotics as appropriate for source of sepsis  Reassessment: I have reassessed the patient's hemodynamic status    Patient started on Rocephin we will continue  Blood cultures ordered and pending   Follow-up with blood cultures and urinary cultures and de-escalate antibiotics as able  Supportive care with IV fluids and Tylenol

## 2022-09-03 NOTE — ED NOTES
Med rec updated and complete. Allergies reviewed. Pt is unable to participate in an interview. Placed call to listed home pharmacy to clarify current medications. No antibiotics noted on file.  All medications listed were filled  June 2022-August 2022 for a 30 day supply.        [place] [facility] [phone]

## 2022-09-03 NOTE — ASSESSMENT & PLAN NOTE
Patient is a prolonged QT C of 546 on EKG  She is on multiple QT prolonging medications including Lexapro and Seroquel  Continue Lexapro, decrease Seroquel from 400 to 100 mg nightly  Avoid QTC prolonging medications  Repeat EKG, QTC QTC -412

## 2022-09-04 PROBLEM — G93.41 SEPSIS WITH ENCEPHALOPATHY WITHOUT SEPTIC SHOCK (HCC): Status: ACTIVE | Noted: 2022-09-03

## 2022-09-04 PROBLEM — R65.20 SEPSIS WITH ENCEPHALOPATHY WITHOUT SEPTIC SHOCK (HCC): Status: ACTIVE | Noted: 2022-09-03

## 2022-09-04 PROBLEM — N12 PYELONEPHRITIS: Status: ACTIVE | Noted: 2022-09-03

## 2022-09-04 LAB
ANION GAP SERPL CALC-SCNC: 5 MMOL/L (ref 7–16)
BASOPHILS # BLD AUTO: 0.1 % (ref 0–1.8)
BASOPHILS # BLD: 0.01 K/UL (ref 0–0.12)
BUN SERPL-MCNC: 15 MG/DL (ref 8–22)
CALCIUM SERPL-MCNC: 8.3 MG/DL (ref 8.5–10.5)
CHLORIDE SERPL-SCNC: 107 MMOL/L (ref 96–112)
CO2 SERPL-SCNC: 27 MMOL/L (ref 20–33)
CREAT SERPL-MCNC: 1 MG/DL (ref 0.5–1.4)
EKG IMPRESSION: NORMAL
EOSINOPHIL # BLD AUTO: 0.02 K/UL (ref 0–0.51)
EOSINOPHIL NFR BLD: 0.3 % (ref 0–6.9)
ERYTHROCYTE [DISTWIDTH] IN BLOOD BY AUTOMATED COUNT: 41.6 FL (ref 35.9–50)
GFR SERPLBLD CREATININE-BSD FMLA CKD-EPI: 67 ML/MIN/1.73 M 2
GLUCOSE SERPL-MCNC: 104 MG/DL (ref 65–99)
HCT VFR BLD AUTO: 35.6 % (ref 37–47)
HGB BLD-MCNC: 12.1 G/DL (ref 12–16)
IMM GRANULOCYTES # BLD AUTO: 0.05 K/UL (ref 0–0.11)
IMM GRANULOCYTES NFR BLD AUTO: 0.7 % (ref 0–0.9)
LYMPHOCYTES # BLD AUTO: 0.84 K/UL (ref 1–4.8)
LYMPHOCYTES NFR BLD: 11.4 % (ref 22–41)
MAGNESIUM SERPL-MCNC: 1.9 MG/DL (ref 1.5–2.5)
MCH RBC QN AUTO: 32.4 PG (ref 27–33)
MCHC RBC AUTO-ENTMCNC: 34 G/DL (ref 33.6–35)
MCV RBC AUTO: 95.4 FL (ref 81.4–97.8)
MONOCYTES # BLD AUTO: 0.94 K/UL (ref 0–0.85)
MONOCYTES NFR BLD AUTO: 12.8 % (ref 0–13.4)
NEUTROPHILS # BLD AUTO: 5.49 K/UL (ref 2–7.15)
NEUTROPHILS NFR BLD: 74.7 % (ref 44–72)
NRBC # BLD AUTO: 0 K/UL
NRBC BLD-RTO: 0 /100 WBC
PLATELET # BLD AUTO: 106 K/UL (ref 164–446)
PMV BLD AUTO: 9.3 FL (ref 9–12.9)
POTASSIUM SERPL-SCNC: 3.9 MMOL/L (ref 3.6–5.5)
RBC # BLD AUTO: 3.73 M/UL (ref 4.2–5.4)
SODIUM SERPL-SCNC: 139 MMOL/L (ref 135–145)
WBC # BLD AUTO: 7.4 K/UL (ref 4.8–10.8)

## 2022-09-04 PROCEDURE — 700102 HCHG RX REV CODE 250 W/ 637 OVERRIDE(OP): Performed by: STUDENT IN AN ORGANIZED HEALTH CARE EDUCATION/TRAINING PROGRAM

## 2022-09-04 PROCEDURE — 700102 HCHG RX REV CODE 250 W/ 637 OVERRIDE(OP): Performed by: GENERAL PRACTICE

## 2022-09-04 PROCEDURE — A9270 NON-COVERED ITEM OR SERVICE: HCPCS | Performed by: GENERAL PRACTICE

## 2022-09-04 PROCEDURE — 85025 COMPLETE CBC W/AUTO DIFF WBC: CPT

## 2022-09-04 PROCEDURE — 700111 HCHG RX REV CODE 636 W/ 250 OVERRIDE (IP): Performed by: STUDENT IN AN ORGANIZED HEALTH CARE EDUCATION/TRAINING PROGRAM

## 2022-09-04 PROCEDURE — 99233 SBSQ HOSP IP/OBS HIGH 50: CPT | Performed by: GENERAL PRACTICE

## 2022-09-04 PROCEDURE — 83735 ASSAY OF MAGNESIUM: CPT

## 2022-09-04 PROCEDURE — 36415 COLL VENOUS BLD VENIPUNCTURE: CPT

## 2022-09-04 PROCEDURE — 770001 HCHG ROOM/CARE - MED/SURG/GYN PRIV*

## 2022-09-04 PROCEDURE — 93005 ELECTROCARDIOGRAM TRACING: CPT | Performed by: GENERAL PRACTICE

## 2022-09-04 PROCEDURE — 93010 ELECTROCARDIOGRAM REPORT: CPT | Performed by: INTERNAL MEDICINE

## 2022-09-04 PROCEDURE — 700105 HCHG RX REV CODE 258: Performed by: STUDENT IN AN ORGANIZED HEALTH CARE EDUCATION/TRAINING PROGRAM

## 2022-09-04 PROCEDURE — A9270 NON-COVERED ITEM OR SERVICE: HCPCS | Performed by: STUDENT IN AN ORGANIZED HEALTH CARE EDUCATION/TRAINING PROGRAM

## 2022-09-04 PROCEDURE — 94760 N-INVAS EAR/PLS OXIMETRY 1: CPT

## 2022-09-04 PROCEDURE — 700111 HCHG RX REV CODE 636 W/ 250 OVERRIDE (IP): Performed by: GENERAL PRACTICE

## 2022-09-04 PROCEDURE — 80048 BASIC METABOLIC PNL TOTAL CA: CPT

## 2022-09-04 PROCEDURE — 700101 HCHG RX REV CODE 250: Performed by: GENERAL PRACTICE

## 2022-09-04 RX ORDER — LIDOCAINE 50 MG/G
1 PATCH TOPICAL EVERY 24 HOURS
Status: DISCONTINUED | OUTPATIENT
Start: 2022-09-04 | End: 2022-09-07 | Stop reason: HOSPADM

## 2022-09-04 RX ORDER — ACETAMINOPHEN 500 MG
1000 TABLET ORAL EVERY 8 HOURS
Status: DISCONTINUED | OUTPATIENT
Start: 2022-09-04 | End: 2022-09-07

## 2022-09-04 RX ADMIN — GABAPENTIN 900 MG: 300 CAPSULE ORAL at 14:19

## 2022-09-04 RX ADMIN — ROPINIROLE HYDROCHLORIDE 0.5 MG: 0.5 TABLET, FILM COATED ORAL at 05:31

## 2022-09-04 RX ADMIN — ENOXAPARIN SODIUM 40 MG: 40 INJECTION SUBCUTANEOUS at 17:08

## 2022-09-04 RX ADMIN — ACETAMINOPHEN 1000 MG: 500 TABLET ORAL at 14:18

## 2022-09-04 RX ADMIN — SODIUM CHLORIDE, POTASSIUM CHLORIDE, SODIUM LACTATE AND CALCIUM CHLORIDE: 600; 310; 30; 20 INJECTION, SOLUTION INTRAVENOUS at 02:45

## 2022-09-04 RX ADMIN — DIVALPROEX SODIUM 1500 MG: 500 TABLET, DELAYED RELEASE ORAL at 21:40

## 2022-09-04 RX ADMIN — ESCITALOPRAM OXALATE 20 MG: 10 TABLET ORAL at 05:31

## 2022-09-04 RX ADMIN — GABAPENTIN 900 MG: 300 CAPSULE ORAL at 21:40

## 2022-09-04 RX ADMIN — ACETAMINOPHEN 1000 MG: 500 TABLET ORAL at 21:40

## 2022-09-04 RX ADMIN — LIDOCAINE 1 PATCH: 50 PATCH TOPICAL at 12:00

## 2022-09-04 RX ADMIN — BUPRENORPHINE HYDROCHLORIDE AND NALOXONE HYDROCHLORIDE DIHYDRATE 1.5 TABLET: 8; 2 TABLET SUBLINGUAL at 05:30

## 2022-09-04 RX ADMIN — ROPINIROLE HYDROCHLORIDE 0.5 MG: 0.5 TABLET, FILM COATED ORAL at 17:08

## 2022-09-04 RX ADMIN — ACETAMINOPHEN 650 MG: 325 TABLET ORAL at 09:35

## 2022-09-04 RX ADMIN — GABAPENTIN 900 MG: 300 CAPSULE ORAL at 05:31

## 2022-09-04 RX ADMIN — DOCUSATE SODIUM 50 MG AND SENNOSIDES 8.6 MG 2 TABLET: 8.6; 5 TABLET, FILM COATED ORAL at 17:08

## 2022-09-04 RX ADMIN — LEVOTHYROXINE SODIUM 50 MCG: 0.05 TABLET ORAL at 05:31

## 2022-09-04 RX ADMIN — DOCUSATE SODIUM 50 MG AND SENNOSIDES 8.6 MG 2 TABLET: 8.6; 5 TABLET, FILM COATED ORAL at 05:31

## 2022-09-04 RX ADMIN — CEFTRIAXONE SODIUM 1 G: 10 INJECTION, POWDER, FOR SOLUTION INTRAVENOUS at 16:09

## 2022-09-04 RX ADMIN — QUETIAPINE FUMARATE 100 MG: 100 TABLET ORAL at 21:40

## 2022-09-04 ASSESSMENT — ENCOUNTER SYMPTOMS
BACK PAIN: 1
NECK PAIN: 1

## 2022-09-04 NOTE — DISCHARGE PLANNING
Received Choice form at [id number]  Agency/Facility Name: [facility]   Referral sent per Choice form @ [id number]

## 2022-09-04 NOTE — PROGRESS NOTES
Patient admitted after being found confused outside by mailman and calling 911. Pt arrived on stretcher to room and was slid over. Purewick in place. Patient is alert and oriented x4 with intermittent forgetfulness. Patient has not been out of bed since admission but feels like she is weak and utilizes cane. Small scratch noted to back of left shoulder. General diet. Takes meds whole with water. Pt baseline is room air - was on 2L when arrived to unit. O2 91% on room air. Call light within reach.

## 2022-09-04 NOTE — HOSPITAL COURSE
This is a 57 year old female with PMHx of hypothyroidism, bipolar disorder type I, fibromyalgia, history of breast cancer who was admitted on 9/3/2022 with sepsis secondary to pyelonephritis and E. coli bacteremia.    Chest x-ray with no evidence of pneumonia or effusion.  COVID, influenza, RSV negative, Pro-Shahid negative. UDS negative.  Patient has a brain MRI from 08/20 chest 2022 which was unremarkable.     Patient started on Rocephin for UTI.  Urine culture pansensitive E. coli.  Blood culture growing E. coli.  Repeat BC NGTD x 48 hours, ECHO negative for vegetations. Patient to continue with Rocephin at this time we will transition to oral ciprofloxacin on discharge.    Patient evaluated by PT/OT with recommendations for outpatient PT, home health ordered and a walker.

## 2022-09-04 NOTE — CARE PLAN
The patient is Stable - Low risk of patient condition declining or worsening    Shift Goals  Clinical Goals: Safety, strength    Progress made toward(s) clinical / shift goals:  Patient educated on new pain regiment and plan of care. Patient has remained free from falls.     Problem: Knowledge Deficit - Standard  Goal: Patient and family/care givers will demonstrate understanding of plan of care, disease process/condition, diagnostic tests and medications  Outcome: Progressing     Problem: Pain - Standard  Goal: Alleviation of pain or a reduction in pain to the patient’s comfort goal  Outcome: Progressing     Problem: Fall Risk  Goal: Patient will remain free from falls  Outcome: Progressing     Patient is not progressing towards the following goals:

## 2022-09-04 NOTE — PROGRESS NOTES
4 Eyes Skin Assessment Completed by Sofia, RN and Quentin RN.    Head WDL  Ears WDL  Nose WDL  Mouth WDL  Neck Redness - sun burn to back of neck  Breast/Chest WDL  Shoulder Blades Redness - scratch to L shoulder  Spine WDL  (R) Arm/Elbow/Hand WDL  (L) Arm/Elbow/Hand WDL  Abdomen WDL  Groin WDL  Scrotum/Coccyx/Buttocks WDL  (R) Leg WDL  (L) Leg WDL  (R) Heel/Foot/Toe WDL  (L) Heel/Foot/Toe WDL          Devices In Places Nasal Cannula      Interventions In Place Gray Ear Foams    Possible Skin Injury No    Pictures Uploaded Into Epic N/A  Wound Consult Placed N/A  RN Wound Prevention Protocol Ordered No

## 2022-09-04 NOTE — PROGRESS NOTES
Hospital Medicine Daily Progress Note    Date of Service  9/4/2022    Chief Complaint  Jemima Piña is a 57 y.o. female admitted 9/3/2022 with abdominal and back pain    Hospital Course  This is a 57 year old female with PMHx of hypothyroidism, bipolar disorder type I, fibromyalgia, history of breast cancer who was admitted on 9/3/2022 with sepsis secondary to pyelonephritis    Chest x-ray with no evidence of pneumonia or effusion.  COVID, influenza, RSV negative, Pro-Shahid negative. UDS negative.  Patient has a brain MRI from 08/20 chest 2022 which was unremarkable.     Patient started on Rocephin for UTI.  Blood culture and urine culture pending.    Interval Problem Update  Patient started on Rocephin for UTI.  Blood culture and urine culture pending.    As per nursing staff, patient's mentation has significantly improved since yesterday.    We are pending urine and blood cultures.    Patient with low cyclic score, we are pending PT/OT eval -to start discharge planning.    I have discussed this patient's plan of care and discharge plan at IDT rounds today with Case Management, Nursing, Nursing leadership, and other members of the IDT team.    Consultants/Specialty  None    Code Status  Full Code    Disposition  Patient is not medically cleared for discharge.   Anticipate discharge to   vs SNF .  I have placed the appropriate orders for post-discharge needs.    Review of Systems  Review of Systems   Musculoskeletal:  Positive for back pain and neck pain.   All other systems reviewed and are negative.     Physical Exam  Temp:  [36.4 °C (97.6 °F)-38.5 °C (101.3 °F)] 37.3 °C (99.1 °F)  Pulse:  [] 81  Resp:  [17-25] 17  BP: ()/(61-73) 98/61  SpO2:  [88 %-98 %] 96 %    Physical Exam  Vitals and nursing note reviewed.   Constitutional:       General: She is not in acute distress.     Appearance: Normal appearance.   HENT:      Head: Normocephalic and atraumatic.      Mouth/Throat:      Mouth: Mucous  membranes are moist.      Pharynx: No oropharyngeal exudate.   Eyes:      Extraocular Movements: Extraocular movements intact.      Pupils: Pupils are equal, round, and reactive to light.   Cardiovascular:      Rate and Rhythm: Normal rate and regular rhythm.      Pulses: Normal pulses.      Heart sounds: No murmur heard.    No friction rub. No gallop.   Pulmonary:      Effort: Pulmonary effort is normal. No respiratory distress.      Breath sounds: No wheezing, rhonchi or rales.   Abdominal:      General: Bowel sounds are normal. There is no distension.      Palpations: Abdomen is soft. There is no mass.      Tenderness: There is no abdominal tenderness. There is left CVA tenderness.   Musculoskeletal:         General: No swelling or tenderness. Normal range of motion.      Cervical back: Normal range of motion. No rigidity. No muscular tenderness.      Right lower leg: No edema.      Left lower leg: No edema.   Skin:     General: Skin is warm and dry.      Capillary Refill: Capillary refill takes less than 2 seconds.      Findings: No erythema or rash.   Neurological:      General: No focal deficit present.      Mental Status: She is alert and oriented to person, place, and time.      Motor: No weakness.      Gait: Gait normal.       Fluids    Intake/Output Summary (Last 24 hours) at 9/4/2022 1211  Last data filed at 9/4/2022 0935  Gross per 24 hour   Intake 240 ml   Output 950 ml   Net -710 ml       Laboratory  Recent Labs     09/03/22  1240 09/04/22  0928   WBC 7.3 7.4   RBC 3.64* 3.73*   HEMOGLOBIN 11.8* 12.1   HEMATOCRIT 34.3* 35.6*   MCV 94.2 95.4   MCH 32.4 32.4   MCHC 34.4 34.0   RDW 39.7 41.6   PLATELETCT 105* 106*   MPV 8.9* 9.3     Recent Labs     09/03/22  1240 09/04/22  0928   SODIUM 134* 139   POTASSIUM 3.2* 3.9   CHLORIDE 99 107   CO2 25 27   GLUCOSE 136* 104*   BUN 15 15   CREATININE 0.87 1.00   CALCIUM 8.4* 8.3*                   Imaging  DX-CHEST-PORTABLE (1 VIEW)   Final Result      Hypoinflation  with LEFT lung base infiltrate or atelectasis.           Assessment/Plan  * Sepsis with encephalopathy without septic shock (HCC)- (present on admission)  Assessment & Plan  This is Sepsis Present on admission  SIRS criteria identified on my evaluation include: Fever, with temperature greater than 101 deg F, Tachycardia, with heart rate greater than 90 BPM and Tachypnea, with respirations greater than 20 per minute  Source is urinary tract infection  Sepsis protocol initiated  Fluid resuscitation ordered per protocol  Crystalloid Fluid Administration: Fluid resuscitation ordered per standard protocol - 30 mL/kg per current or ideal body weight  IV antibiotics as appropriate for source of sepsis  Reassessment: I have reassessed the patient's hemodynamic status    Patient started on Rocephin we will continue  Blood cultures ordered and pending   Follow-up with blood cultures and urinary cultures and de-escalate antibiotics as able  Supportive care with IV fluids and Tylenol      Prolonged Q-T interval on ECG- (present on admission)  Assessment & Plan  Patient is a prolonged QT C of 546 on EKG  She is on multiple QT prolonging medications including Lexapro and Seroquel  Continue Lexapro, decrease Seroquel from 400 to 100 mg nightly  Avoid QTC prolonging medications  Will repeat EKG tomorrow to reevaluate    Pyelonephritis- (present on admission)  Assessment & Plan  1 week of urinary symptoms, + CVA tenderness L>R  UA positive  Follow-up urine and blood cultures  Continue Rocephin, de-escalate pending culture results  IV fluids    Transaminitis- (present on admission)  Assessment & Plan  Elevated AST//62, unclear etiology possibly secondary to sepsis versus underlying EtOH use she also has some anemia and thrombocytopenia and low albumin  -Check BAL and UDS  -Continue to monitor and trend, may need further evaluation    Hypokalemia- (present on admission)  Assessment & Plan  Potassium 3.2  Replace and  monitor    Delirium- (present on admission)  Assessment & Plan  Patient noted to be altered and thus brought to the ER for evaluation however on exam she is completely alert and oriented and able to provide full medical history.  I suspect that her altered mentation may have been secondary to fever, however she also does have some labs that may suggest EtOH  We will check BAL and UDS  Continue to monitor closely  Treat infection as above  Tylenol for fever    Hypothyroidism- (present on admission)  Assessment & Plan  Chronic  Continue home Synthroid  Most recent labs 6 months ago were at goal    Bipolar I disorder, most recent episode depressed (HCC)- (present on admission)  Assessment & Plan  Patient has a history of bipolar 1 disorder with anxiety and depression she states that this is chronic and stable  Continue home medications however reduce the dose of Seroquel secondary to a prolonged QT  Monitor       VTE prophylaxis: SCDs/TEDs and enoxaparin ppx    I have performed a physical exam and reviewed and updated ROS and Plan today (9/4/2022). In review of yesterday's note (9/3/2022), there are no changes except as documented above.

## 2022-09-04 NOTE — CARE PLAN
The patient is Stable - Low risk of patient condition declining or worsening    Shift Goals  Clinical Goals: safety    Progress made toward(s) clinical / shift goals:  Pt was free of falls overnight.    Patient is not progressing towards the following goals:

## 2022-09-05 PROBLEM — R78.81 GRAM-NEGATIVE BACTEREMIA: Status: ACTIVE | Noted: 2022-09-05

## 2022-09-05 LAB
BACTERIA UR CULT: ABNORMAL
BACTERIA UR CULT: ABNORMAL
SIGNIFICANT IND 70042: ABNORMAL
SITE SITE: ABNORMAL
SOURCE SOURCE: ABNORMAL

## 2022-09-05 PROCEDURE — 36415 COLL VENOUS BLD VENIPUNCTURE: CPT

## 2022-09-05 PROCEDURE — 770001 HCHG ROOM/CARE - MED/SURG/GYN PRIV*

## 2022-09-05 PROCEDURE — 700111 HCHG RX REV CODE 636 W/ 250 OVERRIDE (IP): Performed by: STUDENT IN AN ORGANIZED HEALTH CARE EDUCATION/TRAINING PROGRAM

## 2022-09-05 PROCEDURE — 97165 OT EVAL LOW COMPLEX 30 MIN: CPT

## 2022-09-05 PROCEDURE — 700102 HCHG RX REV CODE 250 W/ 637 OVERRIDE(OP): Performed by: STUDENT IN AN ORGANIZED HEALTH CARE EDUCATION/TRAINING PROGRAM

## 2022-09-05 PROCEDURE — 97162 PT EVAL MOD COMPLEX 30 MIN: CPT

## 2022-09-05 PROCEDURE — 87040 BLOOD CULTURE FOR BACTERIA: CPT

## 2022-09-05 PROCEDURE — 99233 SBSQ HOSP IP/OBS HIGH 50: CPT | Performed by: GENERAL PRACTICE

## 2022-09-05 PROCEDURE — A9270 NON-COVERED ITEM OR SERVICE: HCPCS | Performed by: STUDENT IN AN ORGANIZED HEALTH CARE EDUCATION/TRAINING PROGRAM

## 2022-09-05 PROCEDURE — 700111 HCHG RX REV CODE 636 W/ 250 OVERRIDE (IP): Performed by: GENERAL PRACTICE

## 2022-09-05 PROCEDURE — 700102 HCHG RX REV CODE 250 W/ 637 OVERRIDE(OP): Performed by: GENERAL PRACTICE

## 2022-09-05 PROCEDURE — 97535 SELF CARE MNGMENT TRAINING: CPT

## 2022-09-05 PROCEDURE — 700101 HCHG RX REV CODE 250: Performed by: GENERAL PRACTICE

## 2022-09-05 PROCEDURE — A9270 NON-COVERED ITEM OR SERVICE: HCPCS | Performed by: GENERAL PRACTICE

## 2022-09-05 RX ORDER — QUETIAPINE FUMARATE 100 MG/1
200 TABLET, FILM COATED ORAL NIGHTLY
Status: DISCONTINUED | OUTPATIENT
Start: 2022-09-05 | End: 2022-09-06

## 2022-09-05 RX ADMIN — LEVOTHYROXINE SODIUM 50 MCG: 0.05 TABLET ORAL at 05:48

## 2022-09-05 RX ADMIN — POLYETHYLENE GLYCOL 3350 1 PACKET: 17 POWDER, FOR SOLUTION ORAL at 10:20

## 2022-09-05 RX ADMIN — DOCUSATE SODIUM 50 MG AND SENNOSIDES 8.6 MG 2 TABLET: 8.6; 5 TABLET, FILM COATED ORAL at 17:44

## 2022-09-05 RX ADMIN — LIDOCAINE 1 PATCH: 50 PATCH TOPICAL at 10:20

## 2022-09-05 RX ADMIN — DIVALPROEX SODIUM 1500 MG: 500 TABLET, DELAYED RELEASE ORAL at 21:33

## 2022-09-05 RX ADMIN — GABAPENTIN 900 MG: 300 CAPSULE ORAL at 21:33

## 2022-09-05 RX ADMIN — GABAPENTIN 900 MG: 300 CAPSULE ORAL at 14:00

## 2022-09-05 RX ADMIN — ROPINIROLE HYDROCHLORIDE 0.5 MG: 0.5 TABLET, FILM COATED ORAL at 17:44

## 2022-09-05 RX ADMIN — QUETIAPINE FUMARATE 200 MG: 100 TABLET ORAL at 21:33

## 2022-09-05 RX ADMIN — DOCUSATE SODIUM 50 MG AND SENNOSIDES 8.6 MG 2 TABLET: 8.6; 5 TABLET, FILM COATED ORAL at 05:49

## 2022-09-05 RX ADMIN — ESCITALOPRAM OXALATE 20 MG: 10 TABLET ORAL at 05:49

## 2022-09-05 RX ADMIN — ENOXAPARIN SODIUM 40 MG: 40 INJECTION SUBCUTANEOUS at 17:44

## 2022-09-05 RX ADMIN — ACETAMINOPHEN 650 MG: 325 TABLET ORAL at 14:01

## 2022-09-05 RX ADMIN — ACETAMINOPHEN 1000 MG: 500 TABLET ORAL at 05:49

## 2022-09-05 RX ADMIN — BUPRENORPHINE HYDROCHLORIDE AND NALOXONE HYDROCHLORIDE DIHYDRATE 1.5 TABLET: 8; 2 TABLET SUBLINGUAL at 05:48

## 2022-09-05 RX ADMIN — CEFTRIAXONE SODIUM 1 G: 10 INJECTION, POWDER, FOR SOLUTION INTRAVENOUS at 14:01

## 2022-09-05 RX ADMIN — GABAPENTIN 900 MG: 300 CAPSULE ORAL at 05:48

## 2022-09-05 RX ADMIN — ACETAMINOPHEN 1000 MG: 500 TABLET ORAL at 21:34

## 2022-09-05 RX ADMIN — ROPINIROLE HYDROCHLORIDE 0.5 MG: 0.5 TABLET, FILM COATED ORAL at 05:49

## 2022-09-05 ASSESSMENT — COGNITIVE AND FUNCTIONAL STATUS - GENERAL
SUGGESTED CMS G CODE MODIFIER MOBILITY: CJ
CLIMB 3 TO 5 STEPS WITH RAILING: A LITTLE
MOBILITY SCORE: 22
DAILY ACTIVITIY SCORE: 22
TOILETING: A LITTLE
HELP NEEDED FOR BATHING: A LITTLE
WALKING IN HOSPITAL ROOM: A LITTLE
SUGGESTED CMS G CODE MODIFIER DAILY ACTIVITY: CJ

## 2022-09-05 ASSESSMENT — ENCOUNTER SYMPTOMS: BACK PAIN: 1

## 2022-09-05 ASSESSMENT — GAIT ASSESSMENTS
ASSISTIVE DEVICE: FRONT WHEEL WALKER
DISTANCE (FEET): 100
GAIT LEVEL OF ASSIST: SUPERVISED

## 2022-09-05 ASSESSMENT — PAIN DESCRIPTION - PAIN TYPE: TYPE: ACUTE PAIN

## 2022-09-05 ASSESSMENT — ACTIVITIES OF DAILY LIVING (ADL): TOILETING: INDEPENDENT

## 2022-09-05 NOTE — FACE TO FACE
Face to Face Note  -  Durable Medical Equipment    Ann Perez D.O. - NPI: [id number]  I certify that this patient is under my care and that they have had a durable medical equipment(DME)face to face encounter by myself that meets the physician DME face-to-face encounter requirements with this patient on:    Date of encounter:   Patient:                    MRN:                       YOB: 2020  Jemima Piña  [id number]  1965     The encounter with the patient was in whole, or in part, for the following medical condition, which is the primary reason for durable medical equipment:  Other - Unsteady Gait    I certify that, based on my findings, the following durable medical equipment is medically necessary:  Walkers.        ------------------------------------------------------------------------------------------------------------------    Face to Face Supporting Documentation - Home Health    The encounter with this patient was in whole or in part the primary reason for home health admission.    Date of encounter:   Patient:                    MRN:                       YOB: 2020  Jemima Piña  [id number]  1965     Home health to see patient for:  Skilled Nursing care for assessment, interventions & education, Home health aide, Physical Therapy evaluation and treatment, and Occupational therapy evaluation and treatment    Skilled need for:  Exacerbation of Chronic Disease State Debility    Skilled nursing interventions to include:  Comment: PT/OT/HH    Homebound evidenced status by:  Need the aid of supportive devices such as crutches, canes, wheelchairs or walkers. Leaving home must require a considerable and taxing effort. There must exist a normal inability to leave the home.    Community Physician to provide follow up care: Vidhya Uribe P.A.-C.     Optional Interventions    Wound information & treatment:    Home Infusion Therapy orders:     Line/Drain/Airway:    I certify the face to face encounter for this home care referral meets the CMS requirements and the encounter/clinical assessment with the patient was, in whole, or in part, for the medical condition(s) listed above, which is the primary reason for home health care. Based on my clinical findings: the service(s) are medically necessary, support the need for home health care, and the homebound criteria are met.  I certify that this patient has had a face to face encounter by myself.  Ann Perez D.O. - NPI: [id number]    *Debility, frailty and advanced age in the absence of an acute deterioration or exacerbation of a condition do not qualify a patient for home health.

## 2022-09-05 NOTE — DISCHARGE PLANNING
HTH/SCP TCN chart review completed. Collaborated with KELLI Castillo prior to meeting with the pt. The most current review of medical record, knowledge of pt's PLOF and social support, LACE+ score of 70, 6 clicks scores of 18 ADL's and 15 mobility were considered.  There are PT/OT orders, awaiting recommendations to assist with dishcarge planning.  Patient is not medically cleared for discharge.     Pt seen at bedside and reports she lives in a 3rd story apartment with an elevator with her disabled dog.  She was independent with ADL's and IADL's and does not drive.   Her friend Viola, drives her to appointments and shopping.  She reports no concerns with food, housing or transportation.  She ambulated without the use of an AD prior although does have a cane at home that she used after her knee surgery in 1999.  She states she is not at baseline level of function.     Introduced TCN program. Provided education regarding post acute levels of care. Discussed HTH/SCP plan benefits (Meds to Beds, medical uber and [facility] transitional care). Pt verbalizes understanding.     Choice proactively obtained for DME (walker if recommended by PT), HH, SNF, and IRF, faxed to [facility] and given to KELLI. TCN will continue to follow and collaborate with discharge planning team as additional post acute needs arise. Thank you.     Completed today:  Choice obtained: HH, SNF, IRF secondary awaiting recs and discharge plan.   [facility] referral (Y), Sent

## 2022-09-05 NOTE — PROGRESS NOTES
Patient is alert and oriented x4. Up assist of one. Receiving scheduled tylenol. Lidocaine patch in place to back of neck. Purewick in place. Up in chair. Call light within reach.

## 2022-09-05 NOTE — DISCHARGE PLANNING
TCN following. HTH/SCP chart review completed.   Patient seen today at bedside, AOX4, very pleasant, ambulatory using own cane  an/or FWW. Mbr wishes to go home.     OT noted on 9/05/2022 mbr will have no additional OT needs after DC. Recommending Grab Bar(s) by Toilet, Grab Bar(s) in Tub / Shower, Tub / Shower Seat    PT noted on 9/05/2022 recommending Outpatient PT and FWW    Hospitalist noted on 9/04/2022 mbr was not medically cleared for DC with anticipated DC to Home with Home Health vs SNF.  SUSI Medel sent SNF referrals on 9/04/2022- [facility] declined.    TCN will continue to follow and collaborate with discharge planning team as additional post acute needs arise. Thank you.    12:40 PM. TCN received Voalte message from Dr Perez-- HH and DME FWW needs-- Notified Primary KELLI Stokc via Voalte    Previously completed:            OT/PT-- < see above rec>  Choice obtained: HH, SNF, IRF   [facility] referral (Y), Sent

## 2022-09-05 NOTE — PROGRESS NOTES
Hospital Medicine Daily Progress Note    Date of Service  9/5/2022    Chief Complaint  Jemima Piña is a 57 y.o. female admitted 9/3/2022 with abdominal and back pain    Hospital Course  This is a 57 year old female with PMHx of hypothyroidism, bipolar disorder type I, fibromyalgia, history of breast cancer who was admitted on 9/3/2022 with sepsis secondary to pyelonephritis and E. coli bacteremia.    Chest x-ray with no evidence of pneumonia or effusion.  COVID, influenza, RSV negative, Pro-Shahid negative. UDS negative.  Patient has a brain MRI from 08/20 chest 2022 which was unremarkable.     Patient started on Rocephin for UTI.  Urine culture pansensitive E. coli.  Blood culture growing E. coli.  Patient to continue with Rocephin at this time we will transition to oral antibiotics on discharge.    Patient evaluated by PT/OT with recommendations for outpatient PT, home health ordered and a walker.    Interval Problem Update  Patient started on Rocephin for UTI.  Urine culture pansensitive E. coli.  Blood culture growing E. coli.  Patient to continue with Rocephin at this time we will transition to oral antibiotics on discharge. ECHO pending.    Patient evaluated by PT/OT with recommendations for outpatient PT, home health ordered and a walker.    I have discussed this patient's plan of care and discharge plan at IDT rounds today with Case Management, Nursing, Nursing leadership, and other members of the IDT team.    Consultants/Specialty  None    Code Status  Full Code    Disposition  Patient is not medically cleared for discharge.   Anticipate discharge to   vs SNF .  I have placed the appropriate orders for post-discharge needs.    Review of Systems  Review of Systems   Musculoskeletal:  Positive for back pain.   All other systems reviewed and are negative.     Physical Exam  Temp:  [35.9 °C (96.7 °F)-36.8 °C (98.2 °F)] 36.2 °C (97.1 °F)  Pulse:  [61-82] 64  Resp:  [16-17] 17  BP: ()/(48-71)  102/63  SpO2:  [90 %-97 %] 90 %    Physical Exam  Vitals and nursing note reviewed.   Constitutional:       General: She is not in acute distress.     Appearance: Normal appearance.   HENT:      Head: Normocephalic and atraumatic.      Mouth/Throat:      Mouth: Mucous membranes are moist.      Pharynx: No oropharyngeal exudate.   Eyes:      Extraocular Movements: Extraocular movements intact.      Pupils: Pupils are equal, round, and reactive to light.   Cardiovascular:      Rate and Rhythm: Normal rate and regular rhythm.      Pulses: Normal pulses.      Heart sounds: No murmur heard.    No friction rub. No gallop.   Pulmonary:      Effort: Pulmonary effort is normal. No respiratory distress.      Breath sounds: No wheezing, rhonchi or rales.   Abdominal:      General: Bowel sounds are normal. There is no distension.      Palpations: Abdomen is soft. There is no mass.      Tenderness: There is no abdominal tenderness. There is left CVA tenderness.   Musculoskeletal:         General: No swelling or tenderness. Normal range of motion.      Cervical back: Normal range of motion. No rigidity. No muscular tenderness.      Right lower leg: No edema.      Left lower leg: No edema.   Skin:     General: Skin is warm and dry.      Capillary Refill: Capillary refill takes less than 2 seconds.      Findings: No erythema or rash.   Neurological:      General: No focal deficit present.      Mental Status: She is alert and oriented to person, place, and time.      Motor: No weakness.      Gait: Gait normal.       Fluids    Intake/Output Summary (Last 24 hours) at 9/5/2022 1242  Last data filed at 9/5/2022 1000  Gross per 24 hour   Intake 600 ml   Output 950 ml   Net -350 ml       Laboratory  Recent Labs     09/03/22  1240 09/04/22  0928   WBC 7.3 7.4   RBC 3.64* 3.73*   HEMOGLOBIN 11.8* 12.1   HEMATOCRIT 34.3* 35.6*   MCV 94.2 95.4   MCH 32.4 32.4   MCHC 34.4 34.0   RDW 39.7 41.6   PLATELETCT 105* 106*   MPV 8.9* 9.3     Recent  Labs     09/03/22  1240 09/04/22  0928   SODIUM 134* 139   POTASSIUM 3.2* 3.9   CHLORIDE 99 107   CO2 25 27   GLUCOSE 136* 104*   BUN 15 15   CREATININE 0.87 1.00   CALCIUM 8.4* 8.3*                   Imaging  DX-CHEST-PORTABLE (1 VIEW)   Final Result      Hypoinflation with LEFT lung base infiltrate or atelectasis.      EC-ECHOCARDIOGRAM LTD W/O CONT    (Results Pending)        Assessment/Plan  * Sepsis with encephalopathy without septic shock (HCC)- (present on admission)  Assessment & Plan  This is Sepsis Present on admission  SIRS criteria identified on my evaluation include: Fever, with temperature greater than 101 deg F, Tachycardia, with heart rate greater than 90 BPM and Tachypnea, with respirations greater than 20 per minute  Source is urinary tract infection  Sepsis protocol initiated  Fluid resuscitation ordered per protocol  Crystalloid Fluid Administration: Fluid resuscitation ordered per standard protocol - 30 mL/kg per current or ideal body weight  IV antibiotics as appropriate for source of sepsis  Reassessment: I have reassessed the patient's hemodynamic status    Patient started on Rocephin we will continue  Blood cultures ordered and pending   Follow-up with blood cultures and urinary cultures and de-escalate antibiotics as able  Supportive care with IV fluids and Tylenol      Gram-negative bacteremia  Assessment & Plan  Secondary to UTI  Repeat BC 9/5 pending  Continue rocephin    Prolonged Q-T interval on ECG- (present on admission)  Assessment & Plan  Patient is a prolonged QT C of 546 on EKG  She is on multiple QT prolonging medications including Lexapro and Seroquel  Continue Lexapro, decrease Seroquel from 400 to 100 mg nightly  Avoid QTC prolonging medications  Repeat EKG, QTC QTC -412    Pyelonephritis- (present on admission)  Assessment & Plan  1 week of urinary symptoms, + CVA tenderness L>R  UA positive  Follow-up urine and blood cultures  Continue Rocephin, de-escalate pending culture  results  IV fluids    Transaminitis- (present on admission)  Assessment & Plan  Elevated AST//62, unclear etiology possibly secondary to sepsis versus underlying EtOH use she also has some anemia and thrombocytopenia and low albumin  -Check BAL and UDS  -Continue to monitor and trend, may need further evaluation    Hypokalemia- (present on admission)  Assessment & Plan  Potassium 3.2  Replace and monitor    Delirium- (present on admission)  Assessment & Plan  Patient noted to be altered and thus brought to the ER for evaluation however on exam she is completely alert and oriented and able to provide full medical history.  I suspect that her altered mentation may have been secondary to fever, however she also does have some labs that may suggest EtOH  We will check BAL and UDS  Continue to monitor closely  Treat infection as above  Tylenol for fever    Hypothyroidism- (present on admission)  Assessment & Plan  Chronic  Continue home Synthroid  Most recent labs 6 months ago were at goal    Bipolar I disorder, most recent episode depressed (HCC)- (present on admission)  Assessment & Plan  Patient has a history of bipolar 1 disorder with anxiety and depression she states that this is chronic and stable  Continue home medications however reduce the dose of Seroquel secondary to a prolonged QT  Monitor       VTE prophylaxis: SCDs/TEDs and enoxaparin ppx    I have performed a physical exam and reviewed and updated ROS and Plan today (9/5/2022). In review of yesterday's note (9/4/2022), there are no changes except as documented above.

## 2022-09-05 NOTE — THERAPY
Occupational Therapy   Initial Evaluation     Patient Name: Jemima Piña  Age:  57 y.o., Sex:  female  Medical Record #: [id number]  Today's Date: 9/5/2022     Precautions  Precautions: (P) Fall Risk    Assessment  Patient is 57 y.o. female admitted with PMH of hypothyroidism, bipolar disorder, fibromyalgia, breast cancer, and was admitted on 9/3 with sepsis secondary to pyelonephritis. Pt received from PT after functional mobility in the hallway. Pt demonstrates good toelrance with standing at the edge of the sink with FWW to complete grooming tasks. Pt also demonstrates functional transfers. Pt exhibits good tolerance with lower body dressing while seated and is supervision for all evaluations. Pt reports she feels she is at her baseline at this time and expresses no concerns about discharge home. Pt has no further acute OT needs while admitted. Patient will not be actively followed for occupational therapy services at this time, however may be seen if requested by physician for 1 more visit within 30 days to address any discharge or equipment needs.     Plan    Recommend Occupational Therapy for Evaluation only.    DC Equipment Recommendations: (P) Grab Bar(s) by Toilet, Grab Bar(s) in Tub / Shower, Tub / Shower Seat  Discharge Recommendations: (P) Anticipate that the patient will have no further occupational therapy needs after discharge from the hospital      Objective       09/05/22 0838   Initial Contact Note    Initial Contact Note Order Received and Verified, Evaluation Only - Patient Does Not Require Further Acute Occupational Therapy at this Time.  However, May Benefit from Post Acute Therapy for Higher Level Functional Deficits.   Prior Living Situation   Prior Services Home-Independent   Housing / Facility 3 Story Apartment / Condo   Steps Into Home 0   Steps In Home 0   Elevator Yes   Bathroom Set up Walk In Shower   Equipment Owned None   Lives with - Patient's Self Care Capacity Alone and Able to  Care For Self   Comments Pt reports she was previously independent with dressing, cooking, cleaning, and bathing while at home prior to admission.   Prior Level of ADL Function   Self Feeding Independent   Grooming / Hygiene Independent   Bathing Independent   Dressing Independent   Toileting Independent   Comments per pt report   Precautions   Precautions Fall Risk   Cognition    Cognition / Consciousness WDL   Level of Consciousness Alert   Comments AOx4, pt reports feeling much better and that her thoughts are clearer   Active ROM Upper Body   Active ROM Upper Body  WDL   Strength Upper Body   Upper Body Strength  WDL   Sensation Upper Body   Upper Extremity Sensation  WDL   Upper Body Muscle Tone   Upper Body Muscle Tone  WDL   Coordination Upper Body   Coordination WDL   Balance Assessment   Sitting Balance (Static) Good   Sitting Balance (Dynamic) Fair +   Standing Balance (Static) Fair   Standing Balance (Dynamic) Fair -   Weight Shift Sitting Good   Weight Shift Standing Fair   Comments use of FWW   Bed Mobility    Comments NT pt received from PT in standing with FWW   ADL Assessment   Grooming Supervision;Standing   Upper Body Dressing Supervision   Toileting   (NT due to lack of pt need)   Comments use of FWW while standing   How much help from another person does the patient currently need...   Putting on and taking off regular lower body clothing? 4   Bathing (including washing, rinsing, and drying)? 3   Toileting, which includes using a toilet, bedpan, or urinal? 3   Putting on and taking off regular upper body clothing? 4   Taking care of personal grooming such as brushing teeth? 4   Eating meals? 4   6 Clicks Daily Activity Score 22   Functional Mobility   Sit to Stand Supervised   Bed, Chair, Wheelchair Transfer Supervised   Transfer Method Stand Step   Mobility Pt mobilized from hallway into room to the edge of the sink and back to bedside chair   Comments Use of FWW   Activity Tolerance   Sitting  in Chair > 5 mins post session   Standing 8 mins   Comments use of FWW for standing and completing grooming tasks   Education Group   Education Provided Energy Conservation;Role of Occupational Therapist;Activities of Daily Living;Adaptive Equipment   Role of Occupational Therapist Patient Response Patient;Acceptance;Explanation;Verbal Demonstration   Energy Conservation Patient Response Patient;Acceptance;Explanation;Verbal Demonstration;Reinforcement Needed   ADL Patient Response Patient;Acceptance;Explanation;Verbal Demonstration   Adaptive Equipment Patient Response Patient;Acceptance;Explanation;Verbal Demonstration   Problem List   Problem List Decreased Active Daily Living Skills;Decreased Homemaking Skills;Decreased Functional Mobility;Decreased Activity Tolerance   Anticipated Discharge Equipment and Recommendations   DC Equipment Recommendations Grab Bar(s) by Toilet;Grab Bar(s) in Tub / Shower;Tub / Shower Seat   Discharge Recommendations Anticipate that the patient will have no further occupational therapy needs after discharge from the hospital   Interdisciplinary Plan of Care Collaboration   IDT Collaboration with  Nursing;Physical Therapist   Patient Position at End of Therapy Seated;Chair Alarm On;Call Light within Reach;Tray Table within Reach;Phone within Reach   Collaboration Comments RN updated

## 2022-09-05 NOTE — THERAPY
Physical Therapy   Initial Evaluation     Patient Name: Jemima Piña  Age:  57 y.o., Sex:  female  Medical Record #: [id number]  Today's Date: 9/5/2022     Precautions  Precautions: Fall Risk    Assessment  Patient is 57 y.o. female with PMHx of hypothyroidism, bipolar disorder, fibromyalgia, breast cancer, was admitted 9/3 with sepsis secondary to pyelonephritis. Upon evaluation, pt demonstrates mild balance/gait impairments (reportedly ongoing prior to admission) which were optimized with use of FWW. No additional PT indicated at this time, and recommend f/u OP PT to address pre-existing balance/gait deficits.   Plan    Recommend Physical Therapy for Evaluation only.    DC Equipment Recommendations: Front-Wheel Walker  Discharge Recommendations: Recommend outpatient physical therapy services to address higher level deficits       Subjective  Pt reported feeling much better, and denied anticipating any difficulties upon returning home. Pt reported feeling more steady with the walker, and interested in OP PT to improve her walking/balance.      Objective       09/05/22 0831   Charge Group   PT Evaluation PT Evaluation Mod   Total Time Spent   PT Total Time Yes   PT Evaluation Time Spent (Mins) 18   PT Total Time Spent (Calculated) 18   Initial Contact Note    Initial Contact Note Order Received and Verified, Evaluation Only - Patient Does Not Require Further Acute Physical Therapy at this Time.  However, May Benefit from Post Acute Therapy for Higher Level Functional Deficits.   Precautions   Precautions Fall Risk   Vitals   Pulse 64   Pulse Oximetry 90 %   O2 Delivery Device None - Room Air   Pain   Pain Scales 0 to 10 Scale    Pain 0 - 10 Group   Location Neck   Pain Rating Scale (NPRS) 4   Prior Living Situation   Prior Services Home-Independent   Housing / Facility 3 Story Apartment / Condo   Steps Into Home 0   Steps In Home 0   Elevator Yes   Equipment Owned Single Point Cane   Lives with - Patient's Self  Care Capacity Alone and Able to Care For Self   Comments IND with dressing, bathing, cooking, cleaning. Volunteers at a food pantry. Does not drive, and has her friend drive her to appointments and to the grocery store   Prior Level of Functional Mobility   Bed Mobility Independent   Transfer Status Independent   Ambulation Independent   Distance Ambulation (Feet)   (community)   Assistive Devices Used Single Point Cane   Comments Pt reported typically ambulating IND w/out AD; however, started using a SPC in the last few days preceeding admission. Pt reports having increased difficulties with her gait and walking recently and went to the doctor; however, MRI brain was negative   History of Falls   History of Falls No  (Pt reported having some near falls with legs close to giving out, but is normally able to steady herself or take breaks)   Cognition    Level of Consciousness Alert   Comments AOx4. Pt reported feeling a lot better and clearer   Active ROM Lower Body    Active ROM Lower Body  WDL   Strength Lower Body   Lower Body Strength  X   Comments BLE grossly 4+/5, except 4-/5 B hip flexion   Sensation Lower Body   Lower Extremity Sensation   WDL   Lower Body Muscle Tone   Lower Body Muscle Tone  WDL   Strength Upper Body   Upper Body Strength  WDL   Comments BUE grossly 4+/5   Upper Body Muscle Tone   Upper Body Muscle Tone  WDL   Coordination Lower Body    Coordination Lower Body  X   Comments Mild difficulties with LUE>RUE during finger to nose test, primarily difficulties with regulating motor output (tendency for excessive force onto targets). Mild difficulties with FLORI BUE   Balance Assessment   Sitting Balance (Static) Fair +   Standing Balance (Static) Fair   Gait Analysis   Gait Level Of Assist Supervised   Assistive Device Front Wheel Walker   Distance (Feet) 100   # of Times Distance was Traveled 1   Comments Pt also ambulated 200' SBA-CGA w/SPC. Pt demonstrated mild postural instability and  intermittent difficulties maintaining a straight course of direction. Stability/safety improved with use of FWW   Bed Mobility    Supine to Sit Independent   Functional Mobility   Sit to Stand Independent   How much difficulty does the patient currently have...   Turning over in bed (including adjusting bedclothes, sheets and blankets)? 4   Sitting down on and standing up from a chair with arms (e.g., wheelchair, bedside commode, etc.) 4   Moving from lying on back to sitting on the side of the bed? 4   How much help from another person does the patient currently need...   Moving to and from a bed to a chair (including a wheelchair)? 4   Need to walk in a hospital room? 3   Climbing 3-5 steps with a railing? 3   6 clicks Mobility Score 22   Education Group   Education Provided Role of Physical Therapist;Gait Training;Use of Assistive Device   Role of Physical Therapist Patient Response Verbal Demonstration   Gait Training Patient Response Action Demonstration   Use of Assistive Device Patient Response Action Demonstration   Problem List    Problems Impaired Ambulation;Impaired Balance;Impaired Coordination   Anticipated Discharge Equipment and Recommendations   DC Equipment Recommendations Front-Wheel Walker   Discharge Recommendations Recommend outpatient physical therapy services to address higher level deficits   Interdisciplinary Plan of Care Collaboration   IDT Collaboration with  Nursing;Occupational Therapist   Patient Position at End of Therapy Other (Comments)  (left under supervision of OT in the room)   Session Information   Date / Session Number  9/5- eval only

## 2022-09-05 NOTE — CARE PLAN
The patient is Stable - Low risk of patient condition declining or worsening    Shift Goals  Clinical Goals: safety    Progress made toward(s) clinical / shift goals:  Pt remained free from falls overnight.    Patient is not progressing towards the following goals:

## 2022-09-05 NOTE — PROGRESS NOTES
Received report from LAQUITA Scott. Assumed care at 0715. This pt is A&O x4 . Patient and RN discussed plan of care, all questions answered. Labs noted. Call light in place, personal belongings available bed in lowest position, 3 bedrails up, bed alarm on, patient educated on importance of calling for assistance, hourly rounding in place. No additional needs at this time. VSS

## 2022-09-06 ENCOUNTER — APPOINTMENT (OUTPATIENT)
Dept: CARDIOLOGY | Facility: MEDICAL CENTER | Age: 57
DRG: 871 | End: 2022-09-06
Attending: GENERAL PRACTICE
Payer: MEDICARE

## 2022-09-06 ENCOUNTER — HOME HEALTH ADMISSION (OUTPATIENT)
Dept: HOME HEALTH SERVICES | Facility: HOME HEALTHCARE | Age: 57
End: 2022-09-06
Payer: MEDICARE

## 2022-09-06 LAB
BACTERIA BLD CULT: ABNORMAL
BACTERIA BLD CULT: ABNORMAL
LV EJECT FRACT  99904: 65
LV EJECT FRACT MOD 2C 99903: 67.93
LV EJECT FRACT MOD 4C 99902: 74.13
LV EJECT FRACT MOD BP 99901: 70.23
SIGNIFICANT IND 70042: ABNORMAL
SITE SITE: ABNORMAL
SOURCE SOURCE: ABNORMAL

## 2022-09-06 PROCEDURE — 700101 HCHG RX REV CODE 250: Performed by: GENERAL PRACTICE

## 2022-09-06 PROCEDURE — 99232 SBSQ HOSP IP/OBS MODERATE 35: CPT | Performed by: GENERAL PRACTICE

## 2022-09-06 PROCEDURE — RXMED WILLOW AMBULATORY MEDICATION CHARGE: Performed by: GENERAL PRACTICE

## 2022-09-06 PROCEDURE — 700111 HCHG RX REV CODE 636 W/ 250 OVERRIDE (IP): Performed by: GENERAL PRACTICE

## 2022-09-06 PROCEDURE — 700111 HCHG RX REV CODE 636 W/ 250 OVERRIDE (IP): Performed by: STUDENT IN AN ORGANIZED HEALTH CARE EDUCATION/TRAINING PROGRAM

## 2022-09-06 PROCEDURE — 770001 HCHG ROOM/CARE - MED/SURG/GYN PRIV*

## 2022-09-06 PROCEDURE — 93306 TTE W/DOPPLER COMPLETE: CPT

## 2022-09-06 PROCEDURE — A9270 NON-COVERED ITEM OR SERVICE: HCPCS | Performed by: GENERAL PRACTICE

## 2022-09-06 PROCEDURE — 700102 HCHG RX REV CODE 250 W/ 637 OVERRIDE(OP): Performed by: STUDENT IN AN ORGANIZED HEALTH CARE EDUCATION/TRAINING PROGRAM

## 2022-09-06 PROCEDURE — A9270 NON-COVERED ITEM OR SERVICE: HCPCS | Performed by: STUDENT IN AN ORGANIZED HEALTH CARE EDUCATION/TRAINING PROGRAM

## 2022-09-06 PROCEDURE — 93306 TTE W/DOPPLER COMPLETE: CPT | Mod: 26 | Performed by: INTERNAL MEDICINE

## 2022-09-06 PROCEDURE — 700102 HCHG RX REV CODE 250 W/ 637 OVERRIDE(OP): Performed by: GENERAL PRACTICE

## 2022-09-06 RX ORDER — CIPROFLOXACIN 500 MG/1
500 TABLET, FILM COATED ORAL 2 TIMES DAILY
Qty: 4 TABLET | Refills: 0 | Status: SHIPPED | OUTPATIENT
Start: 2022-09-08 | End: 2022-09-10

## 2022-09-06 RX ORDER — QUETIAPINE FUMARATE 100 MG/1
300 TABLET, FILM COATED ORAL NIGHTLY
Status: DISCONTINUED | OUTPATIENT
Start: 2022-09-06 | End: 2022-09-07 | Stop reason: HOSPADM

## 2022-09-06 RX ADMIN — QUETIAPINE FUMARATE 300 MG: 100 TABLET ORAL at 21:55

## 2022-09-06 RX ADMIN — ACETAMINOPHEN 1000 MG: 500 TABLET ORAL at 05:24

## 2022-09-06 RX ADMIN — DOCUSATE SODIUM 50 MG AND SENNOSIDES 8.6 MG 2 TABLET: 8.6; 5 TABLET, FILM COATED ORAL at 05:24

## 2022-09-06 RX ADMIN — LIDOCAINE 1 PATCH: 50 PATCH TOPICAL at 15:59

## 2022-09-06 RX ADMIN — ACETAMINOPHEN 1000 MG: 500 TABLET ORAL at 21:56

## 2022-09-06 RX ADMIN — DOCUSATE SODIUM 50 MG AND SENNOSIDES 8.6 MG 2 TABLET: 8.6; 5 TABLET, FILM COATED ORAL at 17:04

## 2022-09-06 RX ADMIN — CEFTRIAXONE SODIUM 1 G: 10 INJECTION, POWDER, FOR SOLUTION INTRAVENOUS at 16:00

## 2022-09-06 RX ADMIN — ACETAMINOPHEN 1000 MG: 500 TABLET ORAL at 15:59

## 2022-09-06 RX ADMIN — ROPINIROLE HYDROCHLORIDE 0.5 MG: 0.5 TABLET, FILM COATED ORAL at 05:25

## 2022-09-06 RX ADMIN — DIVALPROEX SODIUM 1500 MG: 500 TABLET, DELAYED RELEASE ORAL at 21:55

## 2022-09-06 RX ADMIN — LEVOTHYROXINE SODIUM 50 MCG: 0.05 TABLET ORAL at 05:25

## 2022-09-06 RX ADMIN — GABAPENTIN 900 MG: 300 CAPSULE ORAL at 05:25

## 2022-09-06 RX ADMIN — ENOXAPARIN SODIUM 40 MG: 40 INJECTION SUBCUTANEOUS at 17:04

## 2022-09-06 RX ADMIN — POLYETHYLENE GLYCOL 3350 1 PACKET: 17 POWDER, FOR SOLUTION ORAL at 17:05

## 2022-09-06 RX ADMIN — ROPINIROLE HYDROCHLORIDE 0.5 MG: 0.5 TABLET, FILM COATED ORAL at 17:04

## 2022-09-06 RX ADMIN — ESCITALOPRAM OXALATE 20 MG: 10 TABLET ORAL at 05:25

## 2022-09-06 RX ADMIN — BUPRENORPHINE HYDROCHLORIDE AND NALOXONE HYDROCHLORIDE DIHYDRATE 1.5 TABLET: 8; 2 TABLET SUBLINGUAL at 05:25

## 2022-09-06 RX ADMIN — GABAPENTIN 900 MG: 300 CAPSULE ORAL at 21:55

## 2022-09-06 RX ADMIN — GABAPENTIN 900 MG: 300 CAPSULE ORAL at 15:59

## 2022-09-06 ASSESSMENT — FIBROSIS 4 INDEX: FIB4 SCORE: 6.72

## 2022-09-06 NOTE — PROGRESS NOTES
Alert and able to let her needs known, bed alarm in place for safety with usage of FWW. Cooperative and compliant with care

## 2022-09-06 NOTE — DOCUMENTATION QUERY
"                                                                         [facility]                                                                       Query Response Note      PATIENT:               BRI DANIELS  ACCT #:                  [id number]  MRN:                     [id number]  :                      1965  ADMIT DATE:       9/3/2022 12:31 PM  DISCH DATE:          RESPONDING  PROVIDER #:        [id number]           QUERY TEXT:    Encephalopathy is documented in the Medical Record. Please specify type.    NOTE:  If an appropriate response is not listed below, please respond with a new note.    The patient's Clinical Indicators include:  Encephalopathy is documented for this patient.   Admitted with Sepsis and Pyelonephritis.  Clinical Indicators - patient found confused, incontinent by EMS                                   - noted to by completely alert and oriented, a&ox3 per ED and H&P                                   - no additional altered mental status since admission                                   - afebrile since initial fever on admit  Hospitalist Documentation - \"I suspect that her altered mentation may have been secondary to fever, however                                                        she also does have some labs that may suggest ETOH\"  TX - tylenol, rocephin, 1L NS Bolus    Thank you,  Jessica Greene RN, CCDS  Clinical    Connect via Instant API  Options provided:   -- Metabolic encephalopathy   -- Septic encephalopathy   -- Other type of encephalopathy   -- Unable to determine      Query created by: Jessica Greene on 2022 1:41 PM    RESPONSE TEXT:    Septic encephalopathy          Electronically signed by:  FADY FARRIS MD 2022 2:55 PM              "

## 2022-09-06 NOTE — PROGRESS NOTES
Hospital Medicine Daily Progress Note    Date of Service  9/6/2022    Chief Complaint  Jemima Piña is a 57 y.o. female admitted 9/3/2022 with abdominal and back pain    Hospital Course  This is a 57 year old female with PMHx of hypothyroidism, bipolar disorder type I, fibromyalgia, history of breast cancer who was admitted on 9/3/2022 with sepsis secondary to pyelonephritis and E. coli bacteremia.    Chest x-ray with no evidence of pneumonia or effusion.  COVID, influenza, RSV negative, Pro-Shahid negative. UDS negative.  Patient has a brain MRI from 08/20 chest 2022 which was unremarkable.     Patient started on Rocephin for UTI.  Urine culture pansensitive E. coli.  Blood culture growing E. coli.  Patient to continue with Rocephin at this time we will transition to oral antibiotics on discharge.    Patient evaluated by PT/OT with recommendations for outpatient PT, home health ordered and a walker.    Interval Problem Update  Patient started on Rocephin for UTI.  Urine culture pansensitive E. coli.  Blood culture growing E. coli.  Patient to continue with Rocephin at this time we will transition to oral antibiotics on discharge. ECHO pending.    Patient evaluated by PT/OT with recommendations for outpatient PT, home health ordered and a walker.    Anticipate discharge home on 09/08 when blood cultures will be negative x72 hours.     I have discussed this patient's plan of care and discharge plan at IDT rounds today with Case Management, Nursing, Nursing leadership, and other members of the IDT team.    Consultants/Specialty  None    Code Status  Full Code    Disposition  Patient is not medically cleared for discharge.   Anticipate discharge to   vs SNF .  I have placed the appropriate orders for post-discharge needs.    Review of Systems  Review of Systems   All other systems reviewed and are negative.     Physical Exam  Temp:  [36.6 °C (97.8 °F)-36.7 °C (98 °F)] 36.7 °C (98 °F)  Pulse:  [64-96] 96  Resp:   [17-18] 17  BP: (102-123)/(64-81) 102/64  SpO2:  [92 %-95 %] 92 %    Physical Exam  Vitals and nursing note reviewed.   Constitutional:       General: She is not in acute distress.     Appearance: Normal appearance.   HENT:      Head: Normocephalic and atraumatic.      Mouth/Throat:      Mouth: Mucous membranes are moist.      Pharynx: No oropharyngeal exudate.   Eyes:      Extraocular Movements: Extraocular movements intact.      Pupils: Pupils are equal, round, and reactive to light.   Cardiovascular:      Rate and Rhythm: Normal rate and regular rhythm.      Pulses: Normal pulses.      Heart sounds: No murmur heard.    No friction rub. No gallop.   Pulmonary:      Effort: Pulmonary effort is normal. No respiratory distress.      Breath sounds: No wheezing, rhonchi or rales.   Abdominal:      General: Bowel sounds are normal. There is no distension.      Palpations: Abdomen is soft. There is no mass.      Tenderness: There is no abdominal tenderness. There is left CVA tenderness.   Musculoskeletal:         General: No swelling or tenderness. Normal range of motion.      Cervical back: Normal range of motion. No rigidity. No muscular tenderness.      Right lower leg: No edema.      Left lower leg: No edema.   Skin:     General: Skin is warm and dry.      Capillary Refill: Capillary refill takes less than 2 seconds.      Findings: No erythema or rash.   Neurological:      General: No focal deficit present.      Mental Status: She is alert and oriented to person, place, and time.      Motor: No weakness.      Gait: Gait normal.       Fluids    Intake/Output Summary (Last 24 hours) at 9/6/2022 1145  Last data filed at 9/6/2022 1020  Gross per 24 hour   Intake 980 ml   Output --   Net 980 ml       Laboratory  Recent Labs     09/03/22  1240 09/04/22  0928   WBC 7.3 7.4   RBC 3.64* 3.73*   HEMOGLOBIN 11.8* 12.1   HEMATOCRIT 34.3* 35.6*   MCV 94.2 95.4   MCH 32.4 32.4   MCHC 34.4 34.0   RDW 39.7 41.6   PLATELETCT 105*  106*   MPV 8.9* 9.3     Recent Labs     09/03/22  1240 09/04/22  0928   SODIUM 134* 139   POTASSIUM 3.2* 3.9   CHLORIDE 99 107   CO2 25 27   GLUCOSE 136* 104*   BUN 15 15   CREATININE 0.87 1.00   CALCIUM 8.4* 8.3*                   Imaging  DX-CHEST-PORTABLE (1 VIEW)   Final Result      Hypoinflation with LEFT lung base infiltrate or atelectasis.      EC-ECHOCARDIOGRAM LTD W/O CONT    (Results Pending)        Assessment/Plan  * Sepsis with encephalopathy without septic shock (HCC)- (present on admission)  Assessment & Plan  This is Sepsis Present on admission  SIRS criteria identified on my evaluation include: Fever, with temperature greater than 101 deg F, Tachycardia, with heart rate greater than 90 BPM and Tachypnea, with respirations greater than 20 per minute  Source is urinary tract infection  Sepsis protocol initiated  Fluid resuscitation ordered per protocol  Crystalloid Fluid Administration: Fluid resuscitation ordered per standard protocol - 30 mL/kg per current or ideal body weight  IV antibiotics as appropriate for source of sepsis  Reassessment: I have reassessed the patient's hemodynamic status    Patient started on Rocephin we will continue  Blood cultures ordered and pending   Follow-up with blood cultures and urinary cultures and de-escalate antibiotics as able  Supportive care with IV fluids and Tylenol      Gram-negative bacteremia  Assessment & Plan  Secondary to UTI  Repeat BC 9/5 pending  Continue rocephin    Prolonged Q-T interval on ECG- (present on admission)  Assessment & Plan  Patient is a prolonged QT C of 546 on EKG  She is on multiple QT prolonging medications including Lexapro and Seroquel  Continue Lexapro, decrease Seroquel from 400 to 100 mg nightly  Avoid QTC prolonging medications  Repeat EKG, QTC QTC -412    Pyelonephritis- (present on admission)  Assessment & Plan  1 week of urinary symptoms, + CVA tenderness L>R  UA positive  Follow-up urine and blood cultures  Continue  Rocephin, de-escalate pending culture results  IV fluids    Transaminitis- (present on admission)  Assessment & Plan  Elevated AST//62, unclear etiology possibly secondary to sepsis versus underlying EtOH use she also has some anemia and thrombocytopenia and low albumin  -Check BAL and UDS  -Continue to monitor and trend, may need further evaluation    Hypokalemia- (present on admission)  Assessment & Plan  Potassium 3.2  Replace and monitor    Delirium- (present on admission)  Assessment & Plan  Patient noted to be altered and thus brought to the ER for evaluation however on exam she is completely alert and oriented and able to provide full medical history.  I suspect that her altered mentation may have been secondary to fever, however she also does have some labs that may suggest EtOH  We will check BAL and UDS  Continue to monitor closely  Treat infection as above  Tylenol for fever    Hypothyroidism- (present on admission)  Assessment & Plan  Chronic  Continue home Synthroid  Most recent labs 6 months ago were at goal    Bipolar I disorder, most recent episode depressed (HCC)- (present on admission)  Assessment & Plan  Patient has a history of bipolar 1 disorder with anxiety and depression she states that this is chronic and stable  Continue home medications however reduce the dose of Seroquel secondary to a prolonged QT  Monitor       VTE prophylaxis: SCDs/TEDs and enoxaparin ppx    I have performed a physical exam and reviewed and updated ROS and Plan today (9/6/2022). In review of yesterday's note (9/5/2022), there are no changes except as documented above.

## 2022-09-06 NOTE — DISCHARGE PLANNING
ATTN: Case Management  RE: Referral for Home Health    As of 09/06/2022, we have accepted the Home Health referral for the patient listed above.    A [facility] clinician will be out to see the patient within 48 hours. If you have any questions or concerns regarding the patient’s transition to Home Health, please do not hesitate to contact us at [phone].      We look forward to collaborating with you,  [facility] Team

## 2022-09-06 NOTE — CARE PLAN
The patient is Stable - Low risk of patient condition declining or worsening    Shift Goals  Clinical Goals: increase strenght  Patient Goals: Patient will get stronger so she can go home    Progress made toward(s) clinical / shift goals:  able to get up with sba to bathroom. Uses FWW with longer activity walks in the hallways    Patient is not progressing towards the following goals:

## 2022-09-06 NOTE — DISCHARGE PLANNING
"TCN following. HTH/SCP chart review completed. Collaborated with KELLI Choe and SUSI Mclean.  Patient seen at bedside, has no additional health plan related questions at this time.  Mbr needs FWW DME per MD order.  TCN obtained DME choice, scanned yo SUSI and gave to Primary KELLI Choe 9/06/2022.      OT noted on 9/05/2022 \" Recommendations: (P) Grab Bar(s) by Toilet, Grab Bar(s) in Tub / Shower, Tub / Shower Seat  Discharge Recommendations: (P) Anticipate that the patient will have no further occupational therapy needs after discharge from the hospital \"    PT noted on 9/05/2022 \" Recommendations: Front-Wheel Walker  Discharge Recommendations: Recommend outpatient physical therapy services to address higher level deficits\"       Mbr is not medically cleared for DC as of 9/05/2022   With anticipated DC to Home with [facility] versus SNF    Completed today:  DME Choice 9/06/2022  Previously completed:            OT/PT-- < see above rec>  Choice obtained: HH, SNF, IRF   [facility] referral (Y), Sent                  "

## 2022-09-06 NOTE — DISCHARGE PLANNING
Received Choice form at 8076 on 9/04/22  Agency/Facility Name  [facility]   Referral sent per Choice form @ [id number]

## 2022-09-06 NOTE — DISCHARGE PLANNING
Received Choice form at 0810  Agency/Facility Name: [facility]  Referral sent per Choice form @ [phone]

## 2022-09-06 NOTE — CARE PLAN
The patient is Stable - Low risk of patient condition declining or worsening    Shift Goals  Clinical Goals: Patient will have a bowel movement today  Patient Goals: Patient will get stronger so she can go home    Progress made toward(s) clinical / shift goals:  Patient had bowel movement today

## 2022-09-07 ENCOUNTER — PHARMACY VISIT (OUTPATIENT)
Dept: PHARMACY | Facility: MEDICAL CENTER | Age: 57
End: 2022-09-07
Payer: MEDICARE

## 2022-09-07 VITALS
BODY MASS INDEX: 31.89 KG/M2 | SYSTOLIC BLOOD PRESSURE: 125 MMHG | OXYGEN SATURATION: 99 % | TEMPERATURE: 97.8 F | WEIGHT: 173.28 LBS | HEIGHT: 62 IN | RESPIRATION RATE: 17 BRPM | HEART RATE: 63 BPM | DIASTOLIC BLOOD PRESSURE: 67 MMHG

## 2022-09-07 LAB
EKG IMPRESSION: NORMAL
EKG IMPRESSION: NORMAL

## 2022-09-07 PROCEDURE — 99239 HOSP IP/OBS DSCHRG MGMT >30: CPT | Performed by: GENERAL PRACTICE

## 2022-09-07 PROCEDURE — 700102 HCHG RX REV CODE 250 W/ 637 OVERRIDE(OP): Performed by: STUDENT IN AN ORGANIZED HEALTH CARE EDUCATION/TRAINING PROGRAM

## 2022-09-07 PROCEDURE — A9270 NON-COVERED ITEM OR SERVICE: HCPCS | Performed by: GENERAL PRACTICE

## 2022-09-07 PROCEDURE — A9270 NON-COVERED ITEM OR SERVICE: HCPCS | Performed by: STUDENT IN AN ORGANIZED HEALTH CARE EDUCATION/TRAINING PROGRAM

## 2022-09-07 PROCEDURE — 700102 HCHG RX REV CODE 250 W/ 637 OVERRIDE(OP): Performed by: GENERAL PRACTICE

## 2022-09-07 PROCEDURE — 700111 HCHG RX REV CODE 636 W/ 250 OVERRIDE (IP): Performed by: STUDENT IN AN ORGANIZED HEALTH CARE EDUCATION/TRAINING PROGRAM

## 2022-09-07 PROCEDURE — 93010 ELECTROCARDIOGRAM REPORT: CPT | Performed by: INTERNAL MEDICINE

## 2022-09-07 PROCEDURE — 93005 ELECTROCARDIOGRAM TRACING: CPT | Performed by: GENERAL PRACTICE

## 2022-09-07 RX ADMIN — BUPRENORPHINE HYDROCHLORIDE AND NALOXONE HYDROCHLORIDE DIHYDRATE 1.5 TABLET: 8; 2 TABLET SUBLINGUAL at 05:24

## 2022-09-07 RX ADMIN — ESCITALOPRAM OXALATE 20 MG: 10 TABLET ORAL at 05:24

## 2022-09-07 RX ADMIN — LEVOTHYROXINE SODIUM 50 MCG: 0.05 TABLET ORAL at 05:25

## 2022-09-07 RX ADMIN — ACETAMINOPHEN 1000 MG: 500 TABLET ORAL at 05:26

## 2022-09-07 RX ADMIN — DOCUSATE SODIUM 50 MG AND SENNOSIDES 8.6 MG 2 TABLET: 8.6; 5 TABLET, FILM COATED ORAL at 05:24

## 2022-09-07 RX ADMIN — GABAPENTIN 900 MG: 300 CAPSULE ORAL at 05:24

## 2022-09-07 RX ADMIN — ROPINIROLE HYDROCHLORIDE 0.5 MG: 0.5 TABLET, FILM COATED ORAL at 05:24

## 2022-09-07 ASSESSMENT — PAIN DESCRIPTION - PAIN TYPE: TYPE: ACUTE PAIN

## 2022-09-07 NOTE — PROGRESS NOTES
Pt DC'd. IV removed, discharge instructions provided to patient, pt verbalizes understanding. Pt states all questions have been answered. Copy of discharge paperwork provided to pt, signed copy in chart. Pt states all belongings in possession. Pt escorted off unit by RN without incident.

## 2022-09-07 NOTE — DISCHARGE SUMMARY
Discharge Summary    CHIEF COMPLAINT ON ADMISSION  Chief Complaint   Patient presents with    ALOC     Pt presents for ALOC and incontinence. Pt was found sitting outside her complex by her mailman confused. Pt alert and oriented x1.        Reason for Admission  ems     Admission Date  9/3/2022    CODE STATUS  Full Code    HPI & HOSPITAL COURSE  This is a 57 year old female with PMHx of hypothyroidism, bipolar disorder type I, fibromyalgia, history of breast cancer who was admitted on 9/3/2022 with sepsis secondary to pyelonephritis and E. coli bacteremia.    Chest x-ray with no evidence of pneumonia or effusion.  COVID, influenza, RSV negative, Pro-Shahid negative. UDS negative.  Patient has a brain MRI from 08/20 chest 2022 which was unremarkable.     Patient started on Rocephin for UTI.  Urine culture pansensitive E. coli.  Blood culture growing E. coli.  Repeat BC NGTD x 48 hours, ECHO negative for vegetations. Patient to continue with Rocephin at this time we will transition to oral ciprofloxacin on discharge.    Patient evaluated by PT/OT with recommendations for outpatient PT, home health ordered and a walker.    Therefore, she is discharged in good and stable condition to home with organized home healthcare and close outpatient follow-up.    The patient met 2-midnight criteria for an inpatient stay at the time of discharge.    Discharge Date  09/7/2022    FOLLOW UP ITEMS POST DISCHARGE  Primary care physician    DISCHARGE DIAGNOSES  Principal Problem:    Sepsis with encephalopathy without septic shock (HCC) POA: Yes  Active Problems:    Bipolar I disorder, most recent episode depressed (HCC) POA: Yes      Overview: ICD-10 transition    Hypothyroidism POA: Yes      Overview: ICD-10 transition    Delirium POA: Yes    Hypokalemia POA: Yes    Transaminitis POA: Yes    Pyelonephritis POA: Yes    Prolonged Q-T interval on ECG POA: Yes    Gram-negative bacteremia POA: Unknown  Resolved Problems:    * No resolved  hospital problems. *      FOLLOW UP  Future Appointments   Date Time Provider Department Center   9/23/2022  1:40 PM MAIRA Hess None     No follow-up provider specified.    MEDICATIONS ON DISCHARGE     Medication List        START taking these medications        Instructions   ciprofloxacin 500 MG Tabs  Start taking on: September 8, 2022  Commonly known as: CIPRO   Take 1 Tablet by mouth 2 times a day for 2 days.  Dose: 500 mg            CONTINUE taking these medications        Instructions   Belsomra 20 MG Tabs  Generic drug: Suvorexant   Take 20 mg by mouth at bedtime.  Dose: 20 mg     Buprenorphine HCl-Naloxone HCl 8-2 MG Film   Place 1.5 Film under the tongue every day.  Dose: 1.5 Film     divalproex 500 MG Tbec  Commonly known as: DEPAKOTE   Take 1,500 mg by mouth at bedtime.  Dose: 1,500 mg     escitalopram 20 MG tablet  Commonly known as: LEXAPRO   Take 20 mg by mouth every day.  Dose: 20 mg     gabapentin 300 MG Caps  Commonly known as: NEURONTIN   Take 900 mg by mouth 3 times a day.  Dose: 900 mg     levothyroxine 50 MCG Tabs  Commonly known as: SYNTHROID   Take 50 mcg by mouth every morning on an empty stomach.  Dose: 50 mcg     Naloxone 4 MG/0.1ML Liqd  Commonly known as: NARCAN   Administer 1 Magazine into affected nostril(S) one time.  Dose: 1 Spray     quetiapine 400 MG XR tablet  Commonly known as: SEROQUEL XR   Take 400 mg by mouth every evening.  Dose: 400 mg     ROPINIRole 0.5 MG Tabs  Commonly known as: REQUIP   Take 0.5 mg by mouth 2 times a day.  Dose: 0.5 mg              Allergies  Allergies   Allergen Reactions    Aminophylline      Original entry read AMNOPHYLIN       Asa [Aspirin]     Cyclacillin      Original entry read cyclines    Flu Virus Vaccine     Lybalvi [Olanzapine-Samidorphan]      Altered mental status and aggitation    Pcn [Penicillins]      Historically tolerates cephalosporins     Reglan [Metoclopramide Hcl]     Trazodone      Increased anxiety       DIET  Orders  Placed This Encounter   Procedures    Diet Order Diet: Regular     Standing Status:   Standing     Number of Occurrences:   1     Order Specific Question:   Diet:     Answer:   Regular [1]       ACTIVITY  As tolerated.  Weight bearing as tolerated    CONSULTATIONS  None    PROCEDURES  None    LABORATORY  Lab Results   Component Value Date    SODIUM 139 09/04/2022    POTASSIUM 3.9 09/04/2022    CHLORIDE 107 09/04/2022    CO2 27 09/04/2022    GLUCOSE 104 (H) 09/04/2022    BUN 15 09/04/2022    CREATININE 1.00 09/04/2022        Lab Results   Component Value Date    WBC 7.4 09/04/2022    HEMOGLOBIN 12.1 09/04/2022    HEMATOCRIT 35.6 (L) 09/04/2022    PLATELETCT 106 (L) 09/04/2022      EC-ECHOCARDIOGRAM COMPLETE W/O CONT   Final Result      DX-CHEST-PORTABLE (1 VIEW)   Final Result      Hypoinflation with LEFT lung base infiltrate or atelectasis.         Total time of the discharge process exceeds 55 minutes.

## 2022-09-08 ENCOUNTER — APPOINTMENT (OUTPATIENT)
Dept: RADIOLOGY | Facility: MEDICAL CENTER | Age: 57
End: 2022-09-08
Attending: EMERGENCY MEDICINE
Payer: MEDICARE

## 2022-09-08 ENCOUNTER — HOSPITAL ENCOUNTER (EMERGENCY)
Facility: MEDICAL CENTER | Age: 57
End: 2022-09-09
Attending: EMERGENCY MEDICINE
Payer: MEDICARE
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
## 2022-09-08 DIAGNOSIS — S80.01XA CONTUSION OF RIGHT KNEE, INITIAL ENCOUNTER: ICD-10-CM

## 2022-09-08 DIAGNOSIS — R53.1 WEAKNESS: ICD-10-CM

## 2022-09-08 PROBLEM — M25.562 LEFT KNEE PAIN: Status: ACTIVE | Noted: 2022-09-08

## 2022-09-08 PROBLEM — R29.6 FREQUENT FALLS: Status: ACTIVE | Noted: 2022-09-08

## 2022-09-08 LAB
ALBUMIN SERPL BCP-MCNC: 3.2 G/DL (ref 3.2–4.9)
ALBUMIN/GLOB SERPL: 0.8 G/DL
ALP SERPL-CCNC: 288 U/L (ref 30–99)
ALT SERPL-CCNC: 45 U/L (ref 2–50)
ANION GAP SERPL CALC-SCNC: 8 MMOL/L (ref 7–16)
ANISOCYTOSIS BLD QL SMEAR: ABNORMAL
APPEARANCE UR: CLEAR
AST SERPL-CCNC: 30 U/L (ref 12–45)
BACTERIA #/AREA URNS HPF: NEGATIVE /HPF
BACTERIA BLD CULT: NORMAL
BASOPHILS # BLD AUTO: 0.9 % (ref 0–1.8)
BASOPHILS # BLD: 0.05 K/UL (ref 0–0.12)
BILIRUB SERPL-MCNC: 0.5 MG/DL (ref 0.1–1.5)
BILIRUB UR QL STRIP.AUTO: NEGATIVE
BUN SERPL-MCNC: 9 MG/DL (ref 8–22)
CALCIUM SERPL-MCNC: 9 MG/DL (ref 8.5–10.5)
CHLORIDE SERPL-SCNC: 102 MMOL/L (ref 96–112)
CK SERPL-CCNC: 76 U/L (ref 0–154)
CO2 SERPL-SCNC: 31 MMOL/L (ref 20–33)
COLOR UR: YELLOW
CREAT SERPL-MCNC: 1.08 MG/DL (ref 0.5–1.4)
EOSINOPHIL # BLD AUTO: 0.09 K/UL (ref 0–0.51)
EOSINOPHIL NFR BLD: 1.7 % (ref 0–6.9)
EPI CELLS #/AREA URNS HPF: NEGATIVE /HPF
ERYTHROCYTE [DISTWIDTH] IN BLOOD BY AUTOMATED COUNT: 48 FL (ref 35.9–50)
GFR SERPLBLD CREATININE-BSD FMLA CKD-EPI: 61 ML/MIN/1.73 M 2
GLOBULIN SER CALC-MCNC: 3.8 G/DL (ref 1.9–3.5)
GLUCOSE SERPL-MCNC: 95 MG/DL (ref 65–99)
GLUCOSE UR STRIP.AUTO-MCNC: NEGATIVE MG/DL
HCT VFR BLD AUTO: 40 % (ref 37–47)
HGB BLD-MCNC: 12.9 G/DL (ref 12–16)
HYALINE CASTS #/AREA URNS LPF: ABNORMAL /LPF
KETONES UR STRIP.AUTO-MCNC: NEGATIVE MG/DL
LACTATE SERPL-SCNC: 1 MMOL/L (ref 0.5–2)
LEUKOCYTE ESTERASE UR QL STRIP.AUTO: ABNORMAL
LYMPHOCYTES # BLD AUTO: 1.5 K/UL (ref 1–4.8)
LYMPHOCYTES NFR BLD: 27.8 % (ref 22–41)
MACROCYTES BLD QL SMEAR: ABNORMAL
MANUAL DIFF BLD: NORMAL
MCH RBC QN AUTO: 32.1 PG (ref 27–33)
MCHC RBC AUTO-ENTMCNC: 32.3 G/DL (ref 33.6–35)
MCV RBC AUTO: 99.5 FL (ref 81.4–97.8)
MICRO URNS: ABNORMAL
MONOCYTES # BLD AUTO: 0.66 K/UL (ref 0–0.85)
MONOCYTES NFR BLD AUTO: 12.2 % (ref 0–13.4)
MORPHOLOGY BLD-IMP: NORMAL
NEUTROPHILS # BLD AUTO: 3.05 K/UL (ref 2–7.15)
NEUTROPHILS NFR BLD: 56.5 % (ref 44–72)
NITRITE UR QL STRIP.AUTO: NEGATIVE
NRBC # BLD AUTO: 0 K/UL
NRBC BLD-RTO: 0 /100 WBC
PH UR STRIP.AUTO: 6.5 [PH] (ref 5–8)
PLATELET # BLD AUTO: 246 K/UL (ref 164–446)
PLATELET BLD QL SMEAR: NORMAL
PMV BLD AUTO: 8.8 FL (ref 9–12.9)
POLYCHROMASIA BLD QL SMEAR: NORMAL
POTASSIUM SERPL-SCNC: 4 MMOL/L (ref 3.6–5.5)
PROMYELOCYTES NFR BLD MANUAL: 0.9 %
PROT SERPL-MCNC: 7 G/DL (ref 6–8.2)
PROT UR QL STRIP: NEGATIVE MG/DL
RBC # BLD AUTO: 4.02 M/UL (ref 4.2–5.4)
RBC # URNS HPF: ABNORMAL /HPF
RBC BLD AUTO: PRESENT
RBC UR QL AUTO: NEGATIVE
SIGNIFICANT IND 70042: NORMAL
SITE SITE: NORMAL
SODIUM SERPL-SCNC: 141 MMOL/L (ref 135–145)
SOURCE SOURCE: NORMAL
SP GR UR STRIP.AUTO: 1.01
TSH SERPL DL<=0.005 MIU/L-ACNC: 3.23 UIU/ML (ref 0.38–5.33)
UROBILINOGEN UR STRIP.AUTO-MCNC: 2 MG/DL
VIT B12 SERPL-MCNC: 2380 PG/ML (ref 211–911)
WBC # BLD AUTO: 5.4 K/UL (ref 4.8–10.8)
WBC #/AREA URNS HPF: ABNORMAL /HPF

## 2022-09-08 PROCEDURE — 73564 X-RAY EXAM KNEE 4 OR MORE: CPT | Mod: LT

## 2022-09-08 PROCEDURE — 84443 ASSAY THYROID STIM HORMONE: CPT

## 2022-09-08 PROCEDURE — 99285 EMERGENCY DEPT VISIT HI MDM: CPT

## 2022-09-08 PROCEDURE — 700102 HCHG RX REV CODE 250 W/ 637 OVERRIDE(OP): Performed by: INTERNAL MEDICINE

## 2022-09-08 PROCEDURE — 83605 ASSAY OF LACTIC ACID: CPT

## 2022-09-08 PROCEDURE — A9270 NON-COVERED ITEM OR SERVICE: HCPCS | Performed by: INTERNAL MEDICINE

## 2022-09-08 PROCEDURE — 80053 COMPREHEN METABOLIC PANEL: CPT

## 2022-09-08 PROCEDURE — 99284 EMERGENCY DEPT VISIT MOD MDM: CPT | Performed by: INTERNAL MEDICINE

## 2022-09-08 PROCEDURE — 87040 BLOOD CULTURE FOR BACTERIA: CPT

## 2022-09-08 PROCEDURE — 81001 URINALYSIS AUTO W/SCOPE: CPT

## 2022-09-08 PROCEDURE — 85025 COMPLETE CBC W/AUTO DIFF WBC: CPT

## 2022-09-08 PROCEDURE — 36415 COLL VENOUS BLD VENIPUNCTURE: CPT

## 2022-09-08 PROCEDURE — 700111 HCHG RX REV CODE 636 W/ 250 OVERRIDE (IP): Performed by: INTERNAL MEDICINE

## 2022-09-08 PROCEDURE — 96372 THER/PROPH/DIAG INJ SC/IM: CPT

## 2022-09-08 PROCEDURE — 87086 URINE CULTURE/COLONY COUNT: CPT

## 2022-09-08 PROCEDURE — 82607 VITAMIN B-12: CPT

## 2022-09-08 PROCEDURE — 82550 ASSAY OF CK (CPK): CPT

## 2022-09-08 PROCEDURE — 85007 BL SMEAR W/DIFF WBC COUNT: CPT

## 2022-09-08 RX ORDER — ENOXAPARIN SODIUM 100 MG/ML
40 INJECTION SUBCUTANEOUS DAILY
Status: DISCONTINUED | OUTPATIENT
Start: 2022-09-08 | End: 2022-09-09 | Stop reason: HOSPADM

## 2022-09-08 RX ORDER — AMOXICILLIN 250 MG
2 CAPSULE ORAL 2 TIMES DAILY
Status: DISCONTINUED | OUTPATIENT
Start: 2022-09-08 | End: 2022-09-09 | Stop reason: HOSPADM

## 2022-09-08 RX ORDER — GABAPENTIN 300 MG/1
900 CAPSULE ORAL 3 TIMES DAILY
Status: DISCONTINUED | OUTPATIENT
Start: 2022-09-08 | End: 2022-09-09 | Stop reason: HOSPADM

## 2022-09-08 RX ORDER — QUETIAPINE FUMARATE 200 MG/1
400 TABLET, FILM COATED ORAL NIGHTLY
Status: DISCONTINUED | OUTPATIENT
Start: 2022-09-08 | End: 2022-09-09 | Stop reason: HOSPADM

## 2022-09-08 RX ORDER — MORPHINE SULFATE 4 MG/ML
2 INJECTION INTRAVENOUS
Status: DISCONTINUED | OUTPATIENT
Start: 2022-09-08 | End: 2022-09-09 | Stop reason: HOSPADM

## 2022-09-08 RX ORDER — ACETAMINOPHEN 325 MG/1
650 TABLET ORAL EVERY 6 HOURS PRN
Status: DISCONTINUED | OUTPATIENT
Start: 2022-09-08 | End: 2022-09-08

## 2022-09-08 RX ORDER — OXYCODONE HYDROCHLORIDE 5 MG/1
2.5 TABLET ORAL
Status: DISCONTINUED | OUTPATIENT
Start: 2022-09-08 | End: 2022-09-09 | Stop reason: HOSPADM

## 2022-09-08 RX ORDER — LEVOTHYROXINE SODIUM 0.05 MG/1
50 TABLET ORAL
Status: DISCONTINUED | OUTPATIENT
Start: 2022-09-09 | End: 2022-09-09 | Stop reason: HOSPADM

## 2022-09-08 RX ORDER — ONDANSETRON 2 MG/ML
4 INJECTION INTRAMUSCULAR; INTRAVENOUS EVERY 4 HOURS PRN
Status: DISCONTINUED | OUTPATIENT
Start: 2022-09-08 | End: 2022-09-09 | Stop reason: HOSPADM

## 2022-09-08 RX ORDER — PROCHLORPERAZINE EDISYLATE 5 MG/ML
5-10 INJECTION INTRAMUSCULAR; INTRAVENOUS EVERY 4 HOURS PRN
Status: DISCONTINUED | OUTPATIENT
Start: 2022-09-08 | End: 2022-09-09 | Stop reason: HOSPADM

## 2022-09-08 RX ORDER — PROMETHAZINE HYDROCHLORIDE 25 MG/1
12.5-25 TABLET ORAL EVERY 4 HOURS PRN
Status: DISCONTINUED | OUTPATIENT
Start: 2022-09-08 | End: 2022-09-09 | Stop reason: HOSPADM

## 2022-09-08 RX ORDER — PROMETHAZINE HYDROCHLORIDE 12.5 MG/1
12.5-25 SUPPOSITORY RECTAL EVERY 4 HOURS PRN
Status: DISCONTINUED | OUTPATIENT
Start: 2022-09-08 | End: 2022-09-09 | Stop reason: HOSPADM

## 2022-09-08 RX ORDER — ONDANSETRON 4 MG/1
4 TABLET, ORALLY DISINTEGRATING ORAL EVERY 4 HOURS PRN
Status: DISCONTINUED | OUTPATIENT
Start: 2022-09-08 | End: 2022-09-09 | Stop reason: HOSPADM

## 2022-09-08 RX ORDER — ROPINIROLE 0.5 MG/1
0.5 TABLET, FILM COATED ORAL 2 TIMES DAILY
Status: DISCONTINUED | OUTPATIENT
Start: 2022-09-08 | End: 2022-09-09 | Stop reason: HOSPADM

## 2022-09-08 RX ORDER — POLYETHYLENE GLYCOL 3350 17 G/17G
1 POWDER, FOR SOLUTION ORAL
Status: DISCONTINUED | OUTPATIENT
Start: 2022-09-08 | End: 2022-09-09 | Stop reason: HOSPADM

## 2022-09-08 RX ORDER — DIVALPROEX SODIUM 500 MG/1
1500 TABLET, DELAYED RELEASE ORAL
Status: DISCONTINUED | OUTPATIENT
Start: 2022-09-08 | End: 2022-09-09 | Stop reason: HOSPADM

## 2022-09-08 RX ORDER — ESCITALOPRAM OXALATE 10 MG/1
20 TABLET ORAL DAILY
Status: DISCONTINUED | OUTPATIENT
Start: 2022-09-09 | End: 2022-09-09 | Stop reason: HOSPADM

## 2022-09-08 RX ORDER — CEFDINIR 300 MG/1
300 CAPSULE ORAL EVERY 12 HOURS
Status: DISCONTINUED | OUTPATIENT
Start: 2022-09-08 | End: 2022-09-09 | Stop reason: HOSPADM

## 2022-09-08 RX ORDER — BISACODYL 10 MG
10 SUPPOSITORY, RECTAL RECTAL
Status: DISCONTINUED | OUTPATIENT
Start: 2022-09-08 | End: 2022-09-09 | Stop reason: HOSPADM

## 2022-09-08 RX ORDER — OXYCODONE HYDROCHLORIDE 5 MG/1
5 TABLET ORAL
Status: DISCONTINUED | OUTPATIENT
Start: 2022-09-08 | End: 2022-09-09 | Stop reason: HOSPADM

## 2022-09-08 RX ORDER — ACETAMINOPHEN 500 MG
1000 TABLET ORAL EVERY 8 HOURS
Status: DISCONTINUED | OUTPATIENT
Start: 2022-09-08 | End: 2022-09-09 | Stop reason: HOSPADM

## 2022-09-08 RX ADMIN — CEFDINIR 300 MG: 300 CAPSULE ORAL at 22:33

## 2022-09-08 RX ADMIN — ENOXAPARIN SODIUM 40 MG: 40 INJECTION SUBCUTANEOUS at 19:43

## 2022-09-08 RX ADMIN — OXYCODONE 5 MG: 5 TABLET ORAL at 19:42

## 2022-09-08 RX ADMIN — ROPINIROLE HYDROCHLORIDE 0.5 MG: 0.5 TABLET, FILM COATED ORAL at 19:42

## 2022-09-08 RX ADMIN — QUETIAPINE FUMARATE 400 MG: 200 TABLET ORAL at 22:35

## 2022-09-08 RX ADMIN — DIVALPROEX SODIUM 1500 MG: 500 TABLET, DELAYED RELEASE ORAL at 22:35

## 2022-09-08 RX ADMIN — DOCUSATE SODIUM 50 MG AND SENNOSIDES 8.6 MG 2 TABLET: 8.6; 5 TABLET, FILM COATED ORAL at 19:42

## 2022-09-08 RX ADMIN — ACETAMINOPHEN 1000 MG: 500 TABLET ORAL at 22:34

## 2022-09-08 RX ADMIN — GABAPENTIN 900 MG: 300 CAPSULE ORAL at 19:42

## 2022-09-08 ASSESSMENT — ENCOUNTER SYMPTOMS
WEIGHT LOSS: 0
PALPITATIONS: 0
HEMOPTYSIS: 0
SPUTUM PRODUCTION: 0
COUGH: 0
TREMORS: 0
HEADACHES: 0
PHOTOPHOBIA: 0
FOCAL WEAKNESS: 0
BRUISES/BLEEDS EASILY: 0
HALLUCINATIONS: 0
VOMITING: 0
BACK PAIN: 0
ORTHOPNEA: 0
NERVOUS/ANXIOUS: 0
DOUBLE VISION: 0
CHILLS: 0
SPEECH CHANGE: 0
POLYDIPSIA: 0
FLANK PAIN: 0
NAUSEA: 0
FEVER: 0
NECK PAIN: 0
HEARTBURN: 0
BLURRED VISION: 0
WEAKNESS: 1

## 2022-09-08 ASSESSMENT — FIBROSIS 4 INDEX: FIB4 SCORE: 6.72

## 2022-09-08 ASSESSMENT — LIFESTYLE VARIABLES: SUBSTANCE_ABUSE: 0

## 2022-09-08 NOTE — ED PROVIDER NOTES
"ED Provider Note      Primary care provider: Vidhya Uribe P.A.-C.    I verified that the patient was wearing a mask and I was wearing appropriate PPE every time I entered the room. The patient's mask was on the patient at all times during my encounter except for a brief view of the oropharynx.      CHIEF COMPLAINT  Chief Complaint   Patient presents with    T-5000 FALL     Pt states she was recently discharged from the hospital after receiving treatment for \"e.coli in her blood and a severe UTI\". Pt states she was discharged yesterday \"too early and I keep falling down at home. I still have a lot of medical issues that need to be addressed.\" Pt states she fell today injuring her L knee and her tailbone. Pt did not strike her head or lose consciousness, no blood thinners reported. Bruising noted to L anterior knee, no obvious deformities.       HPI  Jemima Piña is a 57 y.o. female who presents to the Emergency Department with chief complaint of knee pain tailbone pain.  Patient was recently admitted here for UTI/sepsis she was discharged yesterday morning.  States that she was given a walker at home and that physical therapy recommended home therapy however upon returning home patient had difficulty with ambulation has had several falls.  She does not think she hit her head she had no loss of conscious she has severe pain in her left knee she has no chest abdominal or pelvic pain at this time minimal pain over the coccyx.  Patient reports that she feels overall weak she is still having some minimal urinary hesitancy/urgency no fevers slight chills no other acute symptoms changes or concerns.    REVIEW OF SYSTEMS  10 systems reviewed and otherwise negative, pertinent positives and negatives listed in the history of present illness.    PAST MEDICAL HISTORY   has a past medical history of Anxiety state, unspecified, Bipolar I disorder, most recent episode (or current) depressed, unspecified, Breast cancer (HCC) " (), Chronic fatigue syndrome, CKD (chronic kidney disease) stage 2, GFR 60-89 ml/min, Congenital hypertrophic pyloric stenosis, Depression, Edema, Endometriosis, site unspecified, Hemorrhage of rectum and anus, Myalgia and myositis, unspecified, Other and unspecified ovarian cyst, Peptic ulcer, unspecified site, unspecified as acute or chronic, without mention of hemorrhage, perforation, or obstruction, Temporomandibular joint disorders, unspecified, Tobacco use disorder, Unspecified hypothyroidism, and Vaginal candidiasis (10/9/12).    SURGICAL HISTORY   has a past surgical history that includes tonsillectomy (); cholecystectomy (); thyroid lobectomy (); arthroscopy, knee; tubal coagulation laparoscopic bilateral (); and lumpectomy.    SOCIAL HISTORY  Social History     Tobacco Use    Smoking status: Former     Types: Cigarettes     Quit date: 2016     Years since quittin.8    Smokeless tobacco: Never    Tobacco comments:     expect with her emotional disease that she cannot quit at this time   Vaping Use    Vaping Use: Never used   Substance Use Topics    Alcohol use: No    Drug use: No      Social History     Substance and Sexual Activity   Drug Use No       FAMILY HISTORY    family history includes Asthma in her child; Cancer in her maternal grandfather and paternal grandfather; Clotting Disorder in her father; Lung Disease in her child; Other in her mother and sister; Psychiatric Illness in her mother and sister.      CURRENT MEDICATIONS  Home Medications       Reviewed by Lavern Bailey R.N. (Registered Nurse) on 22 at 1231  Med List Status: Not Addressed     Medication Last Dose Status   Buprenorphine HCl-Naloxone HCl 8-2 MG FILM  Active   ciprofloxacin (CIPRO) 500 MG Tab  Active   divalproex (DEPAKOTE) 500 MG Tablet Delayed Response  Active   escitalopram (LEXAPRO) 20 MG tablet  Active   gabapentin (NEURONTIN) 300 MG Cap  Active   levothyroxine (SYNTHROID) 50 MCG Tab   "Active   Naloxone (NARCAN) 4 MG/0.1ML Liquid  Active   quetiapine (SEROQUEL XR) 400 MG XR tablet  Active   ROPINIRole (REQUIP) 0.5 MG Tab  Active   Suvorexant (BELSOMRA) 20 MG Tab  Active                    ALLERGIES  Allergies   Allergen Reactions    Aminophylline      Original entry read AMNOPHYLIN       Asa [Aspirin]     Cyclacillin      Original entry read cyclines    Flu Virus Vaccine     Lybalvi [Olanzapine-Samidorphan]      Altered mental status and aggitation    Pcn [Penicillins]      Historically tolerates cephalosporins     Reglan [Metoclopramide Hcl]     Trazodone      Increased anxiety       PHYSICAL EXAM  VITAL SIGNS: /63   Pulse 82   Temp 36.4 °C (97.5 °F) (Oral)   Resp 16   Ht 1.575 m (5' 2\")   Wt 78.5 kg (173 lb 1 oz)   LMP  (LMP Unknown)   SpO2 94%   BMI 31.65 kg/m²   Pulse ox interpretation: I interpret this pulse ox as normal.  Constitutional: Alert and oriented x 3, minimal distress  HEENT: Atraumatic normocephalic, pupils are equal round, extraocular movements are intact. The nares is clear, external ears are normal, mouth shows moist mucous membranes  Neck: no obvious JVD or tracheal deviation  Cardiovascular: Regular rate and rhythm no murmur rub or gallop   Thorax & Lungs: No respiratory distress, no wheezes rales or rhonchi, No chest tenderness.   GI: Soft nontender nondistended positive bowel sounds, no peritoneal signs  Skin: Warm dry no obvious acute rash or lesion  Musculoskeletal: Moderate ecchymosis and edema over the left anterior knee.  There is tenderness to palpation in this area there is a slight suprapatellar effusion pain with flexion extension left knee she has no pain in her hip or ankle normal cap refill sensation left foot all her lower extremities are unremarkable.  Neurologic: Cranial nerves III through XII are grossly intact, no sensory deficit, no cerebellar dysfunction   Psychiatric: Appropriate affect for situation at this time      DIAGNOSTIC STUDIES / " PROCEDURES  LABS      Results for orders placed or performed during the hospital encounter of 09/08/22   Lactic acid (lactate)   Result Value Ref Range    Lactic Acid 1.0 0.5 - 2.0 mmol/L   CBC WITH DIFFERENTIAL   Result Value Ref Range    WBC 5.4 4.8 - 10.8 K/uL    RBC 4.02 (L) 4.20 - 5.40 M/uL    Hemoglobin 12.9 12.0 - 16.0 g/dL    Hematocrit 40.0 37.0 - 47.0 %    MCV 99.5 (H) 81.4 - 97.8 fL    MCH 32.1 27.0 - 33.0 pg    MCHC 32.3 (L) 33.6 - 35.0 g/dL    RDW 48.0 35.9 - 50.0 fL    Platelet Count 246 164 - 446 K/uL    MPV 8.8 (L) 9.0 - 12.9 fL    Neutrophils-Polys 56.50 44.00 - 72.00 %    Lymphocytes 27.80 22.00 - 41.00 %    Monocytes 12.20 0.00 - 13.40 %    Eosinophils 1.70 0.00 - 6.90 %    Basophils 0.90 0.00 - 1.80 %    Nucleated RBC 0.00 /100 WBC    Neutrophils (Absolute) 3.05 2.00 - 7.15 K/uL    Lymphs (Absolute) 1.50 1.00 - 4.80 K/uL    Monos (Absolute) 0.66 0.00 - 0.85 K/uL    Eos (Absolute) 0.09 0.00 - 0.51 K/uL    Baso (Absolute) 0.05 0.00 - 0.12 K/uL    NRBC (Absolute) 0.00 K/uL    Anisocytosis 1+     Macrocytosis 1+    COMP METABOLIC PANEL   Result Value Ref Range    Sodium 141 135 - 145 mmol/L    Potassium 4.0 3.6 - 5.5 mmol/L    Chloride 102 96 - 112 mmol/L    Co2 31 20 - 33 mmol/L    Anion Gap 8.0 7.0 - 16.0    Glucose 95 65 - 99 mg/dL    Bun 9 8 - 22 mg/dL    Creatinine 1.08 0.50 - 1.40 mg/dL    Calcium 9.0 8.5 - 10.5 mg/dL    AST(SGOT) 30 12 - 45 U/L    ALT(SGPT) 45 2 - 50 U/L    Alkaline Phosphatase 288 (H) 30 - 99 U/L    Total Bilirubin 0.5 0.1 - 1.5 mg/dL    Albumin 3.2 3.2 - 4.9 g/dL    Total Protein 7.0 6.0 - 8.2 g/dL    Globulin 3.8 (H) 1.9 - 3.5 g/dL    A-G Ratio 0.8 g/dL   URINALYSIS    Specimen: Urine   Result Value Ref Range    Color Yellow     Character Clear     Specific Gravity 1.007 <1.035    Ph 6.5 5.0 - 8.0    Glucose Negative Negative mg/dL    Ketones Negative Negative mg/dL    Protein Negative Negative mg/dL    Bilirubin Negative Negative    Urobilinogen, Urine 2.0 Negative     Nitrite Negative Negative    Leukocyte Esterase Small (A) Negative    Occult Blood Negative Negative    Micro Urine Req Microscopic    MORPHOLOGY   Result Value Ref Range    RBC Morphology Present     Polychromia 1+    PERIPHERAL SMEAR REVIEW   Result Value Ref Range    Peripheral Smear Review see below    DIFFERENTIAL MANUAL   Result Value Ref Range    Progranulocytes 0.90 %    Manual Diff Status PERFORMED    PLATELET ESTIMATE   Result Value Ref Range    Plt Estimation Normal    ESTIMATED GFR   Result Value Ref Range    GFR (CKD-EPI) 61 >60 mL/min/1.73 m 2   URINE MICROSCOPIC (W/UA)   Result Value Ref Range    WBC 10-20 (A) /hpf    RBC 2-5 (A) /hpf    Bacteria Negative None /hpf    Epithelial Cells Negative /hpf    Hyaline Cast 0-2 /lpf   VITAMIN B12   Result Value Ref Range    Vitamin B12 -True Cobalamin 2380 (H) 211 - 911 pg/mL   TSH WITH REFLEX TO FT4   Result Value Ref Range    TSH 3.230 0.380 - 5.330 uIU/mL   CREATINE KINASE   Result Value Ref Range    CPK Total 76 0 - 154 U/L       All labs reviewed by me.      RADIOLOGY  DX-KNEE COMPLETE 4+ LEFT   Final Result      Soft tissue swelling. No fracture or dislocation.            COURSE & MEDICAL DECISION MAKING  Pertinent Labs & Imaging studies reviewed. (See chart for details)    3:15 PM - Patient seen and examined at bedside.       Patient noted to have slightly elevated blood pressure likely circumstantial secondary to presenting complaint. Referred to primary care physician for further evaluation.      1800: Patient admitted to ED observation status for PT OT and placement.    Medical Decision Making: Patient still has 10-20 whites in her urine still Estrace positive its not nitrite positive her white count is normal her vital signs are fairly unremarkable here.  Patient is on oral ciprofloxacin at home.  She has been compliant with his medication however is having weakness and several falls at home.  I think patient's likely will require ongoing treatment  "physical therapy occupational therapy I discussed this with hospitalist.  At this point patient is deemed not need acute hospitalization but will be placed on ED observation status for PT OT and probable SNF placement.    /63   Pulse 82   Temp 36.4 °C (97.5 °F) (Oral)   Resp 16   Ht 1.575 m (5' 2\")   Wt 78.5 kg (173 lb 1 oz)   LMP  (LMP Unknown)   SpO2 94%   BMI 31.65 kg/m²             FINAL IMPRESSION  1.  Urinary tract infection  2.  Weakness  3.  Multiple falls  4.  Failure to thrive      This dictation has been created using voice recognition software and/or scribes. The accuracy of the dictation is limited by the abilities of the software and the expertise of the scribes. I expect there may be some errors of grammar and possibly content. I made every attempt to manually correct the errors within my dictation. However, errors related to voice recognition software and/or scribes may still exist and should be interpreted within the appropriate context.            "

## 2022-09-08 NOTE — ED NOTES
Break rn note: assisted pt to ambulated restroom, steady gait.  Urine sample obtained and sent to lab

## 2022-09-08 NOTE — ED TRIAGE NOTES
"Chief Complaint   Patient presents with    T-5000 FALL     Pt states she was recently discharged from the hospital after receiving treatment for \"e.coli in her blood and a severe UTI\". Pt states she was discharged yesterday \"too early and I keep falling down at home. I still have a lot of medical issues that need to be addressed.\" Pt states she fell today injuring her L knee and her tailbone. Pt did not strike her head or lose consciousness, no blood thinners reported. Bruising noted to L anterior knee, no obvious deformities.       Wheelchair to triage for above complaint.   Educated on triage process, encourage to inform staff of any changes.     /63   Pulse 82   Temp 36.4 °C (97.5 °F) (Oral)   Resp 16   Ht 1.575 m (5' 2\")   Wt 78.5 kg (173 lb 1 oz)   LMP  (LMP Unknown)   SpO2 94%   BMI 31.65 kg/m²     "

## 2022-09-08 NOTE — ED NOTES
Med rec completed historically from med rec done on 9/3/2022 pt was just discharged from the hospital on 9/7/2022

## 2022-09-09 VITALS
BODY MASS INDEX: 31.85 KG/M2 | TEMPERATURE: 98.1 F | DIASTOLIC BLOOD PRESSURE: 70 MMHG | WEIGHT: 173.06 LBS | RESPIRATION RATE: 15 BRPM | OXYGEN SATURATION: 91 % | SYSTOLIC BLOOD PRESSURE: 120 MMHG | HEART RATE: 69 BPM | HEIGHT: 62 IN

## 2022-09-09 LAB
SARS-COV+SARS-COV-2 AG RESP QL IA.RAPID: NOTDETECTED
SPECIMEN SOURCE: NORMAL
VALPROATE SERPL-MCNC: 35.9 UG/ML (ref 50–100)

## 2022-09-09 PROCEDURE — 80164 ASSAY DIPROPYLACETIC ACD TOT: CPT

## 2022-09-09 PROCEDURE — 87426 SARSCOV CORONAVIRUS AG IA: CPT

## 2022-09-09 PROCEDURE — 36415 COLL VENOUS BLD VENIPUNCTURE: CPT

## 2022-09-09 PROCEDURE — A9270 NON-COVERED ITEM OR SERVICE: HCPCS | Performed by: INTERNAL MEDICINE

## 2022-09-09 PROCEDURE — 700102 HCHG RX REV CODE 250 W/ 637 OVERRIDE(OP): Performed by: INTERNAL MEDICINE

## 2022-09-09 PROCEDURE — 700111 HCHG RX REV CODE 636 W/ 250 OVERRIDE (IP): Performed by: INTERNAL MEDICINE

## 2022-09-09 PROCEDURE — 82652 VIT D 1 25-DIHYDROXY: CPT

## 2022-09-09 PROCEDURE — 96372 THER/PROPH/DIAG INJ SC/IM: CPT

## 2022-09-09 PROCEDURE — 97163 PT EVAL HIGH COMPLEX 45 MIN: CPT

## 2022-09-09 RX ADMIN — GABAPENTIN 900 MG: 300 CAPSULE ORAL at 05:38

## 2022-09-09 RX ADMIN — ESCITALOPRAM OXALATE 20 MG: 10 TABLET ORAL at 05:39

## 2022-09-09 RX ADMIN — LEVOTHYROXINE SODIUM 50 MCG: 0.05 TABLET ORAL at 05:39

## 2022-09-09 RX ADMIN — ACETAMINOPHEN 1000 MG: 500 TABLET ORAL at 13:09

## 2022-09-09 RX ADMIN — ACETAMINOPHEN 1000 MG: 500 TABLET ORAL at 05:38

## 2022-09-09 RX ADMIN — CEFDINIR 300 MG: 300 CAPSULE ORAL at 05:38

## 2022-09-09 RX ADMIN — ROPINIROLE HYDROCHLORIDE 0.5 MG: 0.5 TABLET, FILM COATED ORAL at 05:39

## 2022-09-09 RX ADMIN — GABAPENTIN 900 MG: 300 CAPSULE ORAL at 13:08

## 2022-09-09 RX ADMIN — CEFDINIR 300 MG: 300 CAPSULE ORAL at 18:00

## 2022-09-09 RX ADMIN — DOCUSATE SODIUM 50 MG AND SENNOSIDES 8.6 MG 2 TABLET: 8.6; 5 TABLET, FILM COATED ORAL at 05:39

## 2022-09-09 RX ADMIN — ENOXAPARIN SODIUM 40 MG: 40 INJECTION SUBCUTANEOUS at 18:00

## 2022-09-09 RX ADMIN — DOCUSATE SODIUM 50 MG AND SENNOSIDES 8.6 MG 2 TABLET: 8.6; 5 TABLET, FILM COATED ORAL at 18:00

## 2022-09-09 ASSESSMENT — GAIT ASSESSMENTS
GAIT LEVEL OF ASSIST: STANDBY ASSIST
ASSISTIVE DEVICE: FRONT WHEEL WALKER
DISTANCE (FEET): 75
DEVIATION: BRADYKINETIC;SHUFFLED GAIT

## 2022-09-09 ASSESSMENT — COGNITIVE AND FUNCTIONAL STATUS - GENERAL
MOVING TO AND FROM BED TO CHAIR: A LOT
WALKING IN HOSPITAL ROOM: A LITTLE
CLIMB 3 TO 5 STEPS WITH RAILING: A LOT
STANDING UP FROM CHAIR USING ARMS: A LITTLE
SUGGESTED CMS G CODE MODIFIER MOBILITY: CK
MOVING FROM LYING ON BACK TO SITTING ON SIDE OF FLAT BED: A LITTLE
MOBILITY SCORE: 17

## 2022-09-09 NOTE — CONSULTS
Hospital Medicine Consultation    Date of Service  9/8/2022    Referring Physician  Dwight Poon M.D.    Consulting Physician  Lokesh Tom M.D.    Reason for Consultation  Weakness, ground-level falls    History of Presenting Illness  57 y.o. female with past medical history of hypothyroidism, bipolar disorder, fibromyalgia, breast cancer, who presented 9/8/2022 with weakness and frequent ground-level falls.  She was admitted to this hospital 9/3-9/7, was discharged yesterday home with home health.  She was treated for pyelonephritis with UA grew pansensitive E. coli, as well as blood culture did.  As she was afebrile, she was transitioned to ciprofloxacin and discharged.  She fell today at least 4 times according to her, felt to her left knee which is hurting now.  X-ray negative for fracture/dislocation.  She denied dysuria to me.  She took 1 dose of ciprofloxacin.  Upon my evaluation, I did not find strong reason to admit her to the hospital.  She is afebrile, and it is appropriate to continue oral antibiotic.  I however will transition her from ciprofloxacin to cefdinir, as ciprofloxacin might cause weakness.  I will hold suvorexant as it could cause weakness as well.  I recheck TSH , check CPK, vitamin B12.    PT OT evaluation ordered    Review of Systems  Review of Systems   Constitutional:  Negative for chills, fever and weight loss.   HENT:  Negative for ear pain, hearing loss and tinnitus.    Eyes:  Negative for blurred vision, double vision and photophobia.   Respiratory:  Negative for cough, hemoptysis and sputum production.    Cardiovascular:  Negative for chest pain, palpitations and orthopnea.   Gastrointestinal:  Negative for heartburn, nausea and vomiting.   Genitourinary:  Negative for dysuria, flank pain, frequency and hematuria.   Musculoskeletal:  Positive for joint pain. Negative for back pain and neck pain.   Skin:  Negative for itching and rash.   Neurological:  Positive for  weakness. Negative for tremors, speech change, focal weakness and headaches.   Endo/Heme/Allergies:  Negative for environmental allergies and polydipsia. Does not bruise/bleed easily.   Psychiatric/Behavioral:  Negative for hallucinations and substance abuse. The patient is not nervous/anxious.      Past Medical History   has a past medical history of Anxiety state, unspecified, Bipolar I disorder, most recent episode (or current) depressed, unspecified, Breast cancer (HCC) (2010), Chronic fatigue syndrome, CKD (chronic kidney disease) stage 2, GFR 60-89 ml/min, Congenital hypertrophic pyloric stenosis, Depression, Edema, Endometriosis, site unspecified, Hemorrhage of rectum and anus, Myalgia and myositis, unspecified, Other and unspecified ovarian cyst, Peptic ulcer, unspecified site, unspecified as acute or chronic, without mention of hemorrhage, perforation, or obstruction, Temporomandibular joint disorders, unspecified, Tobacco use disorder, Unspecified hypothyroidism, and Vaginal candidiasis (10/9/12).    Surgical History   has a past surgical history that includes tonsillectomy (1981); cholecystectomy (1991); thyroid lobectomy (1999); arthroscopy, knee; tubal coagulation laparoscopic bilateral (1994); and lumpectomy.    Family History  family history includes Asthma in her child; Cancer in her maternal grandfather and paternal grandfather; Clotting Disorder in her father; Lung Disease in her child; Other in her mother and sister; Psychiatric Illness in her mother and sister.    Social History   reports that she quit smoking about 5 years ago. Her smoking use included cigarettes. She has never used smokeless tobacco. She reports that she does not drink alcohol and does not use drugs.    Medications  Prior to Admission Medications   Prescriptions Last Dose Informant Patient Reported? Taking?   Buprenorphine HCl-Naloxone HCl 8-2 MG FILM UNK at [facility] Patient's Home Pharmacy Yes No   Sig: Place 1.5 Film under the  tongue every day.   Naloxone (NARCAN) 4 MG/0.1ML Liquid UNK at [facility] Patient's Home Pharmacy Yes No   Sig: Administer 1 Spray into affected nostril(S) one time.   ROPINIRole (REQUIP) 0.5 MG Tab UNK at [facility] Patient's Home Pharmacy Yes No   Sig: Take 0.5 mg by mouth 2 times a day.   Suvorexant (BELSOMRA) 20 MG Tab UNK at [facility] Patient's Home Pharmacy Yes No   Sig: Take 20 mg by mouth at bedtime.   ciprofloxacin (CIPRO) 500 MG Tab UNK at [facility]  No No   Sig: Take 1 Tablet by mouth 2 times a day for 2 days.   divalproex (DEPAKOTE) 500 MG Tablet Delayed Response UNK at [facility] Patient's Home Pharmacy Yes No   Sig: Take 1,500 mg by mouth at bedtime.   escitalopram (LEXAPRO) 20 MG tablet UNK at [facility] Patient's Home Pharmacy Yes No   Sig: Take 20 mg by mouth every day.   gabapentin (NEURONTIN) 300 MG Cap UNK at [facility] Patient's Home Pharmacy Yes No   Sig: Take 900 mg by mouth 3 times a day.   levothyroxine (SYNTHROID) 50 MCG Tab UNK at [facility] Patient's Home Pharmacy Yes No   Sig: Take 50 mcg by mouth every morning on an empty stomach.   quetiapine (SEROQUEL XR) 400 MG XR tablet UNK at [facility] Patient's Home Pharmacy Yes No   Sig: Take 400 mg by mouth every evening.      Facility-Administered Medications: None       Allergies  Allergies   Allergen Reactions    Aminophylline      Original entry read AMNOPHYLIN       Asa [Aspirin]     Cyclacillin      Original entry read cyclines    Flu Virus Vaccine     Lybalvi [Olanzapine-Samidorphan]      Altered mental status and aggitation    Pcn [Penicillins]      Historically tolerates cephalosporins     Reglan [Metoclopramide Hcl]     Trazodone      Increased anxiety       Physical Exam  Temp:  [36.4 °C (97.5 °F)-36.7 °C (98.1 °F)] 36.7 °C (98.1 °F)  Pulse:  [80-89] 88  Resp:  [16] 16  BP: (115-126)/(63-78) 126/78  SpO2:  [91 %-94 %] 93 %    Physical Exam  Vitals and nursing note reviewed.   Constitutional:       General: She is not in acute distress.     Appearance: Normal appearance.   HENT:      Head:  Normocephalic and atraumatic.      Nose: Nose normal.      Mouth/Throat:      Mouth: Mucous membranes are moist.   Eyes:      Extraocular Movements: Extraocular movements intact.      Pupils: Pupils are equal, round, and reactive to light.   Cardiovascular:      Rate and Rhythm: Normal rate and regular rhythm.   Pulmonary:      Effort: Pulmonary effort is normal.      Breath sounds: Normal breath sounds.   Abdominal:      General: Abdomen is flat. There is no distension.      Tenderness: There is no abdominal tenderness.   Musculoskeletal:         General: No swelling or deformity. Normal range of motion.      Cervical back: Normal range of motion and neck supple.      Left knee: Swelling present. Tenderness present.   Skin:     General: Skin is warm and dry.   Neurological:      General: No focal deficit present.      Mental Status: She is alert and oriented to person, place, and time.   Psychiatric:         Mood and Affect: Mood normal.         Behavior: Behavior normal.       Fluids      Laboratory  Recent Labs     09/08/22  1416   WBC 5.4   RBC 4.02*   HEMOGLOBIN 12.9   HEMATOCRIT 40.0   MCV 99.5*   MCH 32.1   MCHC 32.3*   RDW 48.0   PLATELETCT 246   MPV 8.8*     Recent Labs     09/08/22  1416   SODIUM 141   POTASSIUM 4.0   CHLORIDE 102   CO2 31   GLUCOSE 95   BUN 9   CREATININE 1.08   CALCIUM 9.0                     Imaging  DX-KNEE COMPLETE 4+ LEFT   Final Result      Soft tissue swelling. No fracture or dislocation.          Assessment/Plan  Frequent falls  Assessment & Plan  PT OT evaluation, placement to SNF for rehab as appropriate  No gross focal deficit on exam noted.  Fall precautions  PCP to reevaluate medications list for polypharmacy    Left knee pain  Assessment & Plan  X-ray negative for fracture/dislocation  Scheduled Tylenol  Oxycodone as needed  Fall precautions  PT OT evaluation    Pyelonephritis- (present on admission)  Assessment & Plan  With gram-negative luann, E. coli bacteremia.   Pansensitive.  Afebrile, appropriate for oral antibiotics.  Continue antibiotic.  Will switch from ciprofloxacin to cefdinir to limit side effects      Hypothyroidism- (present on admission)  Assessment & Plan  Resume levothyroxine    CKD (chronic kidney disease) stage 2, GFR 60-89 ml/min- (present on admission)  Assessment & Plan  Avoid nephrotoxins

## 2022-09-09 NOTE — ED NOTES
Pt resting comfortably in [facility]. No acute distress noted. Call light within reach.

## 2022-09-09 NOTE — DISCHARGE PLANNING
MSW spoke to Haile at [facility]. They do not have any beds today but possibly tomorrow. MSW spoke to Jenny at [facility]. They accepted pt but don't anticipate having any beds till Monday. MSW spoke to Jenny at [facility]. They do not have any beds today but possibly tomorrow.

## 2022-09-09 NOTE — ASSESSMENT & PLAN NOTE
PT OT evaluation, placement to SNF for rehab as appropriate  No gross focal deficit on exam noted.  Fall precautions  PCP to reevaluate medications list for polypharmacy

## 2022-09-09 NOTE — THERAPY
Physical Therapy   Initial Evaluation     Patient Name: Jemima Piña  Age:  57 y.o., Sex:  female  Medical Record #: [id number]  Today's Date: 9/9/2022     Precautions  Precautions: Fall Risk    Assessment  Pt is a 57 y.o. F who presents to ED after several falls. Pt was DC from hospital 2 days ago after being admitted for UTI. Pt DC with FWW that was recommended per PT previously. Pt with extensive PMH, please see H&P for further details. Pt mobilized as detailed below. Pt is fearful of falling. Pt ambulated with a slow gait speed with decreased stride length. Pt was educated on importance of mobility to address current deficits. Pt states compliance. Pt presents with deficits in activity tolerance, balance, mobility, and strength. Due to multiple recent falls and current deficits, pt will benefit from skilled physical therapy services.     Plan    Recommend Physical Therapy 3 times per week until therapy goals are met for the following treatments:  Bed Mobility, Community Re-integration, Equipment, Gait Training, Manual Therapy, Neuro Re-Education / Balance, Self Care/Home Evaluation, Stair Training, Therapeutic Activities, and Therapeutic Exercises    DC Equipment Recommendations: None (for PT, pt stated ADL equipment needs)  Discharge Recommendations: Recommend post-acute placement for additional physical therapy services prior to discharge home       Objective     09/09/22 1038   Initial Contact Note    Initial Contact Note Order Received and Verified, Physical Therapy Evaluation in Progress with Full Report to Follow.   Precautions   Precautions Fall Risk   Pain 0 - 10 Group   Therapist Pain Assessment Post Activity Pain Same as Prior to Activity;Nurse Notified;0   Prior Living Situation   Housing / Facility 3 Story Apartment / Condo   Elevator Yes   Bathroom Set up Walk In Shower   Equipment Owned Front-Wheel Walker;Single Point Cane  (Pt states that she needs grab bars and a shower chair)   Lives with -  Patient's Self Care Capacity Alone and Able to Care For Self   Comments Pt has stairs that she needs to climb to participate at [facility]   Prior Level of Functional Mobility   Bed Mobility Independent   Transfer Status Independent   Ambulation Independent   Distance Ambulation (Feet)   (community)   Assistive Devices Used Front-Wheel Walker   Stairs Independent   History of Falls   History of Falls Yes   Date of Last Fall   (reason for admit)   Cognition    Cognition / Consciousness WDL   Passive ROM Lower Body   Passive ROM Lower Body WDL   Active ROM Lower Body    Active ROM Lower Body  WDL   Strength Lower Body   Lower Body Strength  X   Gross Strength Generalized Weakness, Equal Bilaterally   Comments B LE 4/5, pt experienced pain with overpressure on knee extension   Sensation Lower Body   Lower Extremity Sensation   WDL   Coordination Lower Body    Coordination Lower Body  WDL   Balance Assessment   Sitting Balance (Static) Fair +   Sitting Balance (Dynamic) Fair   Standing Balance (Static) Fair -   Standing Balance (Dynamic) Fair -   Weight Shift Sitting Good   Weight Shift Standing Fair   Gait Analysis   Gait Level Of Assist Standby Assist  (close SBA due to recent falls)   Assistive Device Front Wheel Walker   Distance (Feet) 75   # of Times Distance was Traveled 1   Deviation Bradykinetic;Shuffled Gait   Weight Bearing Status no restriction   Bed Mobility    Supine to Sit Minimal Assist  (with elevated HOB)   Sit to Supine Standby Assist   Scooting Standby Assist   Functional Mobility   Sit to Stand Contact Guard Assist   Bed, Chair, Wheelchair Transfer Contact Guard Assist   Mobility sup to sit, sts, gait, sit to sup   How much difficulty does the patient currently have...   Turning over in bed (including adjusting bedclothes, sheets and blankets)? 4   Sitting down on and standing up from a chair with arms (e.g., wheelchair, bedside commode, etc.) 3   Moving from lying on back to sitting on the side of the  bed? 2   How much help from another person does the patient currently need...   Moving to and from a bed to a chair (including a wheelchair)? 3   Need to walk in a hospital room? 3   Climbing 3-5 steps with a railing? 2   6 clicks Mobility Score 17   Activity Tolerance   Sitting Edge of Bed 3 min   Standing 9 min   Patient / Family Goals    Patient / Family Goal #1 Pt stop falling down   Short Term Goals    Short Term Goal # 1 Pt will complete bed mobility without use of bed features with SPV within 6 visits   Short Term Goal # 2 Pt will complete transfers with LRAD with SPV within 6 visits to improve in home mobility   Short Term Goal # 3 Pt will ambulate 250 feet with LRAD with SPV within 6 visits to improve functional mobility   Short Term Goal # 4 Pt will ascend/descend 4 steps with LRAD with SPV wtihin 6 visits to allow for [employer] participation   Education Group   Education Provided Role of Physical Therapist   Role of Physical Therapist Patient Response Patient;Acceptance;Explanation;Verbal Demonstration   Problem List    Problems Impaired Bed Mobility;Impaired Ambulation;Impaired Transfers;Impaired Balance;Functional Strength Deficit;Impaired Coordination;Decreased Activity Tolerance   Anticipated Discharge Equipment and Recommendations   DC Equipment Recommendations None  (for PT, pt stated ADL equipment needs)   Discharge Recommendations Recommend post-acute placement for additional physical therapy services prior to discharge home   Interdisciplinary Plan of Care Collaboration   IDT Collaboration with  ;Nursing   Patient Position at End of Therapy In Bed;Call Light within Reach;Tray Table within Reach;Phone within Reach   Collaboration Comments P findings   Session Information   Date / Session Number  9/9-1(1/3, 9/15)

## 2022-09-09 NOTE — ASSESSMENT & PLAN NOTE
With gram-negative luann, E. coli bacteremia.  Pansensitive.  Afebrile, appropriate for oral antibiotics.  Continue antibiotic.  Will switch from ciprofloxacin to cefdinir to limit side effects

## 2022-09-09 NOTE — DISCHARGE SUMMARY
"  ED Observation Discharge Summary    Patient:Jemima Piña  Patient : 1965  Patient MRN: [id number]  Patient PCP: Vidhya Uribe P.A.-C.    Admit Date: 2022  Discharge Date and Time: 22 2:00 PM  Discharge Diagnosis: Fall, failure to thrive  Discharge Attending: Tyson Ibrahim D.O.  Discharge Service: ED Observation    ED Course  Jemima is a 57 y.o. female who was evaluated at [facility] for frequent falls.  She was recently diagnosed with E. coli in her blood secondary urinary tract infection states that she was discharged too soon and kept on falling.  She states that she is unable to really take care of herself secondary to her overall weakness.  She had evaluation here that was negative for sepsis, urine culture is pending at that point time, she was placed on cefdinir.  The patient has no other significant electrolyte abnormalities upon discharge.  She will be discharged back to a skilled nursing facility for further evaluation and management.    Discharge Exam:  /65   Pulse 64   Temp 36.7 °C (98.1 °F)   Resp 15   Ht 1.575 m (5' 2\")   Wt 78.5 kg (173 lb 1 oz)   LMP  (LMP Unknown)   SpO2 94%   BMI 31.65 kg/m² .    Constitutional: Awake and alert. Nontoxic  HENT:  Grossly normal  Eyes: Grossly normal  Neck: Normal range of motion  Cardiovascular: Normal heart rate   Thorax & Lungs: No respiratory distress  Abdomen: Nontender  Skin:  No pathologic rash.   Extremities: Well perfused  Psychiatric: Flat affect    Labs  Results for orders placed or performed during the hospital encounter of 22   Lactic acid (lactate)   Result Value Ref Range    Lactic Acid 1.0 0.5 - 2.0 mmol/L   CBC WITH DIFFERENTIAL   Result Value Ref Range    WBC 5.4 4.8 - 10.8 K/uL    RBC 4.02 (L) 4.20 - 5.40 M/uL    Hemoglobin 12.9 12.0 - 16.0 g/dL    Hematocrit 40.0 37.0 - 47.0 %    MCV 99.5 (H) 81.4 - 97.8 fL    MCH 32.1 27.0 - 33.0 pg    MCHC 32.3 (L) 33.6 - 35.0 g/dL    RDW 48.0 " 35.9 - 50.0 fL    Platelet Count 246 164 - 446 K/uL    MPV 8.8 (L) 9.0 - 12.9 fL    Neutrophils-Polys 56.50 44.00 - 72.00 %    Lymphocytes 27.80 22.00 - 41.00 %    Monocytes 12.20 0.00 - 13.40 %    Eosinophils 1.70 0.00 - 6.90 %    Basophils 0.90 0.00 - 1.80 %    Nucleated RBC 0.00 /100 WBC    Neutrophils (Absolute) 3.05 2.00 - 7.15 K/uL    Lymphs (Absolute) 1.50 1.00 - 4.80 K/uL    Monos (Absolute) 0.66 0.00 - 0.85 K/uL    Eos (Absolute) 0.09 0.00 - 0.51 K/uL    Baso (Absolute) 0.05 0.00 - 0.12 K/uL    NRBC (Absolute) 0.00 K/uL    Anisocytosis 1+     Macrocytosis 1+    COMP METABOLIC PANEL   Result Value Ref Range    Sodium 141 135 - 145 mmol/L    Potassium 4.0 3.6 - 5.5 mmol/L    Chloride 102 96 - 112 mmol/L    Co2 31 20 - 33 mmol/L    Anion Gap 8.0 7.0 - 16.0    Glucose 95 65 - 99 mg/dL    Bun 9 8 - 22 mg/dL    Creatinine 1.08 0.50 - 1.40 mg/dL    Calcium 9.0 8.5 - 10.5 mg/dL    AST(SGOT) 30 12 - 45 U/L    ALT(SGPT) 45 2 - 50 U/L    Alkaline Phosphatase 288 (H) 30 - 99 U/L    Total Bilirubin 0.5 0.1 - 1.5 mg/dL    Albumin 3.2 3.2 - 4.9 g/dL    Total Protein 7.0 6.0 - 8.2 g/dL    Globulin 3.8 (H) 1.9 - 3.5 g/dL    A-G Ratio 0.8 g/dL   URINALYSIS    Specimen: Urine   Result Value Ref Range    Color Yellow     Character Clear     Specific Gravity 1.007 <1.035    Ph 6.5 5.0 - 8.0    Glucose Negative Negative mg/dL    Ketones Negative Negative mg/dL    Protein Negative Negative mg/dL    Bilirubin Negative Negative    Urobilinogen, Urine 2.0 Negative    Nitrite Negative Negative    Leukocyte Esterase Small (A) Negative    Occult Blood Negative Negative    Micro Urine Req Microscopic    BLOOD CULTURE    Specimen: Peripheral; Blood   Result Value Ref Range    Significant Indicator NEG     Source BLD     Site PERIPHERAL     Culture Result       No Growth  Note: Blood cultures are incubated for 5 days and  are monitored continuously.Positive blood cultures  are called to the RN and reported as soon as  they are  identified.     MORPHOLOGY   Result Value Ref Range    RBC Morphology Present     Polychromia 1+    PERIPHERAL SMEAR REVIEW   Result Value Ref Range    Peripheral Smear Review see below    DIFFERENTIAL MANUAL   Result Value Ref Range    Progranulocytes 0.90 %    Manual Diff Status PERFORMED    PLATELET ESTIMATE   Result Value Ref Range    Plt Estimation Normal    ESTIMATED GFR   Result Value Ref Range    GFR (CKD-EPI) 61 >60 mL/min/1.73 m 2   URINE MICROSCOPIC (W/UA)   Result Value Ref Range    WBC 10-20 (A) /hpf    RBC 2-5 (A) /hpf    Bacteria Negative None /hpf    Epithelial Cells Negative /hpf    Hyaline Cast 0-2 /lpf   VITAMIN B12   Result Value Ref Range    Vitamin B12 -True Cobalamin 2380 (H) 211 - 911 pg/mL   TSH WITH REFLEX TO FT4   Result Value Ref Range    TSH 3.230 0.380 - 5.330 uIU/mL   CREATINE KINASE   Result Value Ref Range    CPK Total 76 0 - 154 U/L   VALPROIC ACID   Result Value Ref Range    Valproic Acid 35.9 (L) 50.0 - 100.0 ug/mL       Radiology  DX-KNEE COMPLETE 4+ LEFT   Final Result      Soft tissue swelling. No fracture or dislocation.          Disposition: Discharge to skilled nursing facility    Follow up: No follow-ups on file.    Medications:   New Prescriptions    No medications on file   I did start her home medications    Discharge Condition: Stable    Electronically signed by: Tyson Ibrahim D.O., 9/9/2022 2:00 PM

## 2022-09-09 NOTE — DISCHARGE PLANNING
HTH/SCP TCN chart review completed. Collaborated with Carl Coronel and Trisha. The most current review of medical record, knowledge of pt's PLOF and social support, LACE+ score of 73, 6 clicks scores of ( none entered)  mobility were considered.      Pt seen at bedside,AOX4, receiving care from RN. AOX4 , walked to  the bathroom with assistance.  Mbr endorses living alone, modified independent using FWW at baseline. Introduced TCN program. Provided education regarding post acute levels of care. Discussed HTH/SCP plan benefits (Meds to Beds, medical uber and [facility] transitional care). Pt verbalizes understanding.   OT PT recommendations pending. Choice proactively obtained for SNF, HH and given to Carl Rico. Mbr amenable to [facility] services, sending referral to [facility] given HIGH LACE score of 73 in setting of frequent falls.TCN will continue to follow and collaborate with discharge planning team as additional post acute needs arise. Thank you.     Completed today:  Choice obtained: SNF, HH  [facility] referral ( sent) 9/09/2022

## 2022-09-09 NOTE — ASSESSMENT & PLAN NOTE
X-ray negative for fracture/dislocation  Scheduled Tylenol  Oxycodone as needed  Fall precautions  PT OT evaluation

## 2022-09-09 NOTE — DISCHARGE PLANNING
SW updated by ERP that Pt is going to be staying ED OBS and that PT/OT evaluations have been placed.  Pt reportedly discharged yesterday and has fallen down at home several times since.  SW met with Pt and discussed ED OBS status.  Pt informed this SW that she lives alone and feels that she was discharged home too early.  She shared that SNF referrals were placed while she was here and then the plan all of a sudden changed to Home with HH.     Pt agreeable to plan of having PT/OT complete evals.  Pt stated that she understands that SNF referrals can be placed if recommended by PT/OT.  Choice for SNF was discussed and completed.

## 2022-09-09 NOTE — DISCHARGE PLANNING
MSW received VM from Bre at [facility]. They can accept pt today. MSW met with pt. Pt agreeable to transfer to [facility] today. MSW arranged transport with T for [id number]. Res#[id number] Cost $147.55. Med rec completed by ERP and faxed to [facility]. MSW requested ERP complete discharge summary. RX script for Suvorexant placed in packet. COBRA and transfer packet will be placed on chart.

## 2022-09-09 NOTE — ED NOTES
Pt ambulated to bathroom with steady gait requiring assistance of 1. Pt returned to room. Call light within reach.

## 2022-09-10 LAB
1,25(OH)2D3 SERPL-MCNC: 31.1 PG/ML (ref 19.9–79.3)
BACTERIA BLD CULT: NORMAL
BACTERIA BLD CULT: NORMAL
BACTERIA UR CULT: NORMAL
SIGNIFICANT IND 70042: NORMAL
SITE SITE: NORMAL
SOURCE SOURCE: NORMAL

## 2022-09-10 NOTE — DISCHARGE PLANNING
ATTN: Case Management  RE: Referral for Home Health    Reason for referral denial: Patient discharged to SNF.              Unfortunately, we are not able to accept this referral for the reason listed above. If further clarity is needed, our Transitional Care Specialists are available to discuss any barriers to service at [phone].      We look forward to collaborating with you in the future,  [facility] Team

## 2022-09-10 NOTE — DISCHARGE PLANNING
TERE from [place] with [facility] pharmacy ([phone]) stating hand written prescription for oxy does not have a quantity.    KELLI discussed with Dr Palacios who reviewed case and stated to dispense #20 for 5 days. Endy updated.

## 2022-09-10 NOTE — ED NOTES
Pt discharged to [facility] for transportation to [place] and [facility]. Pts belongings given to pt.

## 2022-09-13 LAB
BACTERIA BLD CULT: NORMAL
SIGNIFICANT IND 70042: NORMAL
SITE SITE: NORMAL
SOURCE SOURCE: NORMAL

## 2022-09-23 ENCOUNTER — APPOINTMENT (OUTPATIENT)
Dept: NEUROLOGY | Facility: MEDICAL CENTER | Age: 57
End: 2022-09-23
Attending: PSYCHIATRY & NEUROLOGY
Payer: MEDICARE

## 2022-11-09 PROCEDURE — RXMED WILLOW AMBULATORY MEDICATION CHARGE: Performed by: NURSE PRACTITIONER

## 2022-11-09 PROCEDURE — RXMED WILLOW AMBULATORY MEDICATION CHARGE: Performed by: PSYCHIATRY & NEUROLOGY

## 2022-11-11 ENCOUNTER — PHARMACY VISIT (OUTPATIENT)
Dept: PHARMACY | Facility: MEDICAL CENTER | Age: 57
End: 2022-11-11
Payer: MEDICARE

## 2022-11-11 PROCEDURE — RXMED WILLOW AMBULATORY MEDICATION CHARGE: Performed by: STUDENT IN AN ORGANIZED HEALTH CARE EDUCATION/TRAINING PROGRAM

## 2022-11-11 RX ORDER — DARIDOREXANT 25 MG/1
TABLET, FILM COATED ORAL
Qty: 90 TABLET | Refills: 0 | OUTPATIENT
Start: 2022-11-11 | End: 2023-02-09 | Stop reason: SDUPTHER

## 2022-11-18 ENCOUNTER — PHARMACY VISIT (OUTPATIENT)
Dept: PHARMACY | Facility: MEDICAL CENTER | Age: 57
End: 2022-11-18
Payer: MEDICARE

## 2022-12-08 PROBLEM — E78.49 OTHER HYPERLIPIDEMIA: Status: ACTIVE | Noted: 2022-12-08

## 2022-12-08 PROBLEM — F33.41 RECURRENT MAJOR DEPRESSIVE DISORDER, IN PARTIAL REMISSION (HCC): Status: ACTIVE | Noted: 2022-03-08

## 2022-12-08 PROBLEM — G93.41 SEPSIS WITH ENCEPHALOPATHY WITHOUT SEPTIC SHOCK (HCC): Status: RESOLVED | Noted: 2022-09-03 | Resolved: 2022-12-08

## 2022-12-08 PROBLEM — R65.20 SEPSIS WITH ENCEPHALOPATHY WITHOUT SEPTIC SHOCK (HCC): Status: RESOLVED | Noted: 2022-09-03 | Resolved: 2022-12-08

## 2022-12-08 PROBLEM — N12 PYELONEPHRITIS: Status: RESOLVED | Noted: 2022-09-03 | Resolved: 2022-12-08

## 2022-12-08 PROBLEM — R78.81 GRAM-NEGATIVE BACTEREMIA: Status: RESOLVED | Noted: 2022-09-05 | Resolved: 2022-12-08

## 2022-12-08 PROBLEM — Z79.899 CHRONIC USE OF BENZODIAZEPINE FOR THERAPEUTIC PURPOSE: Status: RESOLVED | Noted: 2021-07-16 | Resolved: 2022-12-08

## 2022-12-08 PROBLEM — Z85.3 HISTORY OF RIGHT BREAST CANCER: Status: ACTIVE | Noted: 2022-12-08

## 2022-12-08 PROBLEM — G31.9 CEREBRAL ATROPHY, MILD (HCC): Status: ACTIVE | Noted: 2022-12-08

## 2022-12-08 PROBLEM — A41.9 SEPSIS WITH ENCEPHALOPATHY WITHOUT SEPTIC SHOCK (HCC): Status: RESOLVED | Noted: 2022-09-03 | Resolved: 2022-12-08

## 2022-12-08 PROBLEM — E66.09 OBESITY DUE TO EXCESS CALORIES WITH SERIOUS COMORBIDITY: Status: ACTIVE | Noted: 2022-12-08

## 2022-12-08 PROBLEM — R94.31 PROLONGED Q-T INTERVAL ON ECG: Status: RESOLVED | Noted: 2022-09-03 | Resolved: 2022-12-08

## 2022-12-08 PROBLEM — F10.21 ALCOHOLISM IN REMISSION (HCC): Status: ACTIVE | Noted: 2022-12-08

## 2022-12-08 PROBLEM — R94.30 NONSPECIFIC ABNORMAL FUNCTION STUDY, CARDIOVASCULAR: Status: ACTIVE | Noted: 2022-12-08

## 2022-12-08 PROBLEM — R29.6 FREQUENT FALLS: Status: RESOLVED | Noted: 2022-09-08 | Resolved: 2022-12-08

## 2022-12-09 ENCOUNTER — HOSPITAL ENCOUNTER (OUTPATIENT)
Dept: LAB | Facility: MEDICAL CENTER | Age: 57
End: 2022-12-09
Attending: STUDENT IN AN ORGANIZED HEALTH CARE EDUCATION/TRAINING PROGRAM
Payer: MEDICARE

## 2022-12-09 LAB
ALBUMIN SERPL BCP-MCNC: 4 G/DL (ref 3.2–4.9)
ALBUMIN/GLOB SERPL: 1.4 G/DL
ALP SERPL-CCNC: 130 U/L (ref 30–99)
ALT SERPL-CCNC: 14 U/L (ref 2–50)
ANION GAP SERPL CALC-SCNC: 9 MMOL/L (ref 7–16)
APPEARANCE UR: CLEAR
AST SERPL-CCNC: 21 U/L (ref 12–45)
BASOPHILS # BLD AUTO: 0.4 % (ref 0–1.8)
BASOPHILS # BLD: 0.01 K/UL (ref 0–0.12)
BILIRUB SERPL-MCNC: 0.2 MG/DL (ref 0.1–1.5)
BILIRUB UR QL STRIP.AUTO: NEGATIVE
BUN SERPL-MCNC: 27 MG/DL (ref 8–22)
CALCIUM SERPL-MCNC: 9.5 MG/DL (ref 8.5–10.5)
CHLORIDE SERPL-SCNC: 103 MMOL/L (ref 96–112)
CO2 SERPL-SCNC: 28 MMOL/L (ref 20–33)
COLOR UR: YELLOW
CREAT SERPL-MCNC: 0.83 MG/DL (ref 0.5–1.4)
EOSINOPHIL # BLD AUTO: 0.03 K/UL (ref 0–0.51)
EOSINOPHIL NFR BLD: 1.1 % (ref 0–6.9)
ERYTHROCYTE [DISTWIDTH] IN BLOOD BY AUTOMATED COUNT: 40.5 FL (ref 35.9–50)
GFR SERPLBLD CREATININE-BSD FMLA CKD-EPI: 83 ML/MIN/1.73 M 2
GLOBULIN SER CALC-MCNC: 2.8 G/DL (ref 1.9–3.5)
GLUCOSE SERPL-MCNC: 162 MG/DL (ref 65–99)
GLUCOSE UR STRIP.AUTO-MCNC: NEGATIVE MG/DL
HCT VFR BLD AUTO: 42.5 % (ref 37–47)
HGB BLD-MCNC: 13.7 G/DL (ref 12–16)
IMM GRANULOCYTES # BLD AUTO: 0.01 K/UL (ref 0–0.11)
IMM GRANULOCYTES NFR BLD AUTO: 0.4 % (ref 0–0.9)
KETONES UR STRIP.AUTO-MCNC: NEGATIVE MG/DL
LEUKOCYTE ESTERASE UR QL STRIP.AUTO: NEGATIVE
LYMPHOCYTES # BLD AUTO: 1.56 K/UL (ref 1–4.8)
LYMPHOCYTES NFR BLD: 57.1 % (ref 22–41)
MCH RBC QN AUTO: 31.3 PG (ref 27–33)
MCHC RBC AUTO-ENTMCNC: 32.2 G/DL (ref 33.6–35)
MCV RBC AUTO: 97 FL (ref 81.4–97.8)
MICRO URNS: NORMAL
MONOCYTES # BLD AUTO: 0.22 K/UL (ref 0–0.85)
MONOCYTES NFR BLD AUTO: 8.1 % (ref 0–13.4)
NEUTROPHILS # BLD AUTO: 0.9 K/UL (ref 2–7.15)
NEUTROPHILS NFR BLD: 32.9 % (ref 44–72)
NITRITE UR QL STRIP.AUTO: NEGATIVE
NRBC # BLD AUTO: 0 K/UL
NRBC BLD-RTO: 0 /100 WBC
PH UR STRIP.AUTO: 6 [PH] (ref 5–8)
PLATELET # BLD AUTO: 126 K/UL (ref 164–446)
PMV BLD AUTO: 9.4 FL (ref 9–12.9)
POTASSIUM SERPL-SCNC: 5 MMOL/L (ref 3.6–5.5)
PROT SERPL-MCNC: 6.8 G/DL (ref 6–8.2)
PROT UR QL STRIP: NEGATIVE MG/DL
RBC # BLD AUTO: 4.38 M/UL (ref 4.2–5.4)
RBC UR QL AUTO: NEGATIVE
SODIUM SERPL-SCNC: 140 MMOL/L (ref 135–145)
SP GR UR STRIP.AUTO: 1.02
TSH SERPL DL<=0.005 MIU/L-ACNC: 6.53 UIU/ML (ref 0.38–5.33)
UROBILINOGEN UR STRIP.AUTO-MCNC: 0.2 MG/DL
WBC # BLD AUTO: 2.7 K/UL (ref 4.8–10.8)

## 2022-12-09 PROCEDURE — 85025 COMPLETE CBC W/AUTO DIFF WBC: CPT

## 2022-12-09 PROCEDURE — 36415 COLL VENOUS BLD VENIPUNCTURE: CPT

## 2022-12-09 PROCEDURE — 84443 ASSAY THYROID STIM HORMONE: CPT

## 2022-12-09 PROCEDURE — 81003 URINALYSIS AUTO W/O SCOPE: CPT

## 2022-12-09 PROCEDURE — 80053 COMPREHEN METABOLIC PANEL: CPT

## 2022-12-19 ENCOUNTER — PHARMACY VISIT (OUTPATIENT)
Dept: PHARMACY | Facility: MEDICAL CENTER | Age: 57
End: 2022-12-19
Payer: MEDICARE

## 2022-12-19 PROCEDURE — RXMED WILLOW AMBULATORY MEDICATION CHARGE: Performed by: NURSE PRACTITIONER

## 2022-12-20 ENCOUNTER — PHARMACY VISIT (OUTPATIENT)
Dept: PHARMACY | Facility: MEDICAL CENTER | Age: 57
End: 2022-12-20
Payer: MEDICARE

## 2022-12-20 PROCEDURE — RXMED WILLOW AMBULATORY MEDICATION CHARGE: Performed by: PSYCHIATRY & NEUROLOGY

## 2024-03-13 ENCOUNTER — APPOINTMENT (RX ONLY)
Dept: URBAN - METROPOLITAN AREA CLINIC 4 | Facility: CLINIC | Age: 59
Setting detail: DERMATOLOGY
End: 2024-03-13

## 2024-03-13 DIAGNOSIS — D18.0 HEMANGIOMA: ICD-10-CM

## 2024-03-13 DIAGNOSIS — L81.4 OTHER MELANIN HYPERPIGMENTATION: ICD-10-CM

## 2024-03-13 DIAGNOSIS — Z71.89 OTHER SPECIFIED COUNSELING: ICD-10-CM

## 2024-03-13 DIAGNOSIS — D22 MELANOCYTIC NEVI: ICD-10-CM

## 2024-03-13 DIAGNOSIS — L57.0 ACTINIC KERATOSIS: ICD-10-CM | Status: INADEQUATELY CONTROLLED

## 2024-03-13 DIAGNOSIS — L82.1 OTHER SEBORRHEIC KERATOSIS: ICD-10-CM

## 2024-03-13 DIAGNOSIS — D485 NEOPLASM OF UNCERTAIN BEHAVIOR OF SKIN: ICD-10-CM | Status: INADEQUATELY CONTROLLED

## 2024-03-13 PROBLEM — D22.5 MELANOCYTIC NEVI OF TRUNK: Status: ACTIVE | Noted: 2024-03-13

## 2024-03-13 PROBLEM — D18.01 HEMANGIOMA OF SKIN AND SUBCUTANEOUS TISSUE: Status: ACTIVE | Noted: 2024-03-13

## 2024-03-13 PROBLEM — D48.5 NEOPLASM OF UNCERTAIN BEHAVIOR OF SKIN: Status: ACTIVE | Noted: 2024-03-13

## 2024-03-13 PROCEDURE — ? SUNSCREEN RECOMMENDATIONS

## 2024-03-13 PROCEDURE — ? COUNSELING

## 2024-03-13 PROCEDURE — 11102 TANGNTL BX SKIN SINGLE LES: CPT

## 2024-03-13 PROCEDURE — ? LIQUID NITROGEN

## 2024-03-13 PROCEDURE — ? BIOPSY BY SHAVE METHOD

## 2024-03-13 PROCEDURE — 99203 OFFICE O/P NEW LOW 30 MIN: CPT | Mod: 25

## 2024-03-13 PROCEDURE — 17000 DESTRUCT PREMALG LESION: CPT | Mod: 59

## 2024-03-13 PROCEDURE — ? PRESCRIPTION

## 2024-03-13 ASSESSMENT — LOCATION SIMPLE DESCRIPTION DERM
LOCATION SIMPLE: LEFT CHEEK
LOCATION SIMPLE: POSTERIOR NECK
LOCATION SIMPLE: NOSE
LOCATION SIMPLE: LEFT LOWER BACK
LOCATION SIMPLE: ABDOMEN
LOCATION SIMPLE: RIGHT CHEEK
LOCATION SIMPLE: LEFT UPPER BACK

## 2024-03-13 ASSESSMENT — LOCATION ZONE DERM
LOCATION ZONE: TRUNK
LOCATION ZONE: NOSE
LOCATION ZONE: FACE
LOCATION ZONE: NECK

## 2024-03-13 ASSESSMENT — LOCATION DETAILED DESCRIPTION DERM
LOCATION DETAILED: LEFT SUPERIOR LATERAL MALAR CHEEK
LOCATION DETAILED: RIGHT INFERIOR CENTRAL MALAR CHEEK
LOCATION DETAILED: NASAL SUPRATIP
LOCATION DETAILED: LEFT INFERIOR UPPER BACK
LOCATION DETAILED: EPIGASTRIC SKIN
LOCATION DETAILED: LEFT SUPERIOR LATERAL MIDBACK
LOCATION DETAILED: RIGHT MEDIAL TRAPEZIAL NECK

## 2024-03-13 NOTE — PROCEDURE: BIOPSY BY SHAVE METHOD
Detail Level: Detailed
Depth Of Biopsy: dermis
Was A Bandage Applied: Yes
Size Of Lesion In Cm: 0.8
X Size Of Lesion In Cm: 0
Biopsy Type: H and E
Biopsy Method: Dermablade
Anesthesia Type: 1% lidocaine without epinephrine
Anesthesia Volume In Cc: 0.5
Hemostasis: Drysol
Wound Care: Petrolatum
Dressing: bandage
Destruction After The Procedure: No
Type Of Destruction Used: Curettage
Curettage Text: The wound bed was treated with curettage after the biopsy was performed.
Cryotherapy Text: The wound bed was treated with cryotherapy after the biopsy was performed.
Electrodesiccation Text: The wound bed was treated with electrodesiccation after the biopsy was performed.
Electrodesiccation And Curettage Text: The wound bed was treated with electrodesiccation and curettage after the biopsy was performed.
Silver Nitrate Text: The wound bed was treated with silver nitrate after the biopsy was performed.
Lab: [id number]
Lab Facility: 
Consent: Written consent was obtained and risks were reviewed including but not limited to scarring, infection, bleeding, scabbing, incomplete removal, nerve damage and allergy to anesthesia.
Post-Care Instructions: I reviewed with the patient in detail post-care instructions. Patient is to keep the biopsy site dry overnight, and then apply bacitracin twice daily until healed. Patient may apply hydrogen peroxide soaks to remove any crusting.
Notification Instructions: Patient will be notified of biopsy results. However, patient instructed to call the office if not contacted within 2 weeks.
Billing Type: Third-Party Bill
Information: Selecting Yes will display possible errors in your note based on the variables you have selected. This validation is only offered as a suggestion for you. PLEASE NOTE THAT THE VALIDATION TEXT WILL BE REMOVED WHEN YOU FINALIZE YOUR NOTE. IF YOU WANT TO FAX A PRELIMINARY NOTE YOU WILL NEED TO TOGGLE THIS TO 'NO' IF YOU DO NOT WANT IT IN YOUR FAXED NOTE.

## 2024-03-13 NOTE — PROCEDURE: LIQUID NITROGEN
Post-Care Instructions: I reviewed with the patient in detail post-care instructions. Patient is to wear sunprotection, and avoid picking at any of the treated lesions. Pt may apply Vaseline to crusted or scabbing areas.
Duration Of Freeze Thaw-Cycle (Seconds): 4
Application Tool (Optional): Liquid Nitrogen Sprayer
Render Note In Bullet Format When Appropriate: No
Detail Level: Detailed
Show Aperture Variable?: Yes
Consent: The patient's consent was obtained including but not limited to risks of crusting, scabbing, blistering, scarring, darker or lighter pigmentary change, recurrence, incomplete removal and infection.
Number Of Freeze-Thaw Cycles: 1 freeze-thaw cycle

## 2024-03-15 ENCOUNTER — RX ONLY (OUTPATIENT)
Age: 59
Setting detail: RX ONLY
End: 2024-03-15

## 2024-03-15 RX ORDER — FLUOROURACIL 2 G/40G
CREAM TOPICAL
Qty: 40 | Refills: 0 | Status: ERX | COMMUNITY
Start: 2024-03-15

## 2024-06-11 ENCOUNTER — APPOINTMENT (RX ONLY)
Dept: URBAN - METROPOLITAN AREA CLINIC 4 | Facility: CLINIC | Age: 59
Setting detail: DERMATOLOGY
End: 2024-06-11

## 2024-06-11 DIAGNOSIS — Z71.89 OTHER SPECIFIED COUNSELING: ICD-10-CM

## 2024-06-11 PROBLEM — D04.39 CARCINOMA IN SITU OF SKIN OF OTHER PARTS OF FACE: Status: ACTIVE | Noted: 2024-06-11

## 2024-06-11 PROCEDURE — ? DIAGNOSIS COMMENT

## 2024-06-11 PROCEDURE — ? PHOTO-DOCUMENTATION

## 2024-06-11 PROCEDURE — ? COUNSELING

## 2024-06-11 PROCEDURE — ? SUNSCREEN RECOMMENDATIONS

## 2024-06-11 PROCEDURE — 99213 OFFICE O/P EST LOW 20 MIN: CPT

## 2024-06-11 NOTE — PROCEDURE: DIAGNOSIS COMMENT
Detail Level: Detailed
Render Risk Assessment In Note?: no
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 Will continue to monitor.

## 2024-10-29 ENCOUNTER — HOSPITAL ENCOUNTER (OUTPATIENT)
Facility: MEDICAL CENTER | Age: 59
End: 2024-10-29
Attending: STUDENT IN AN ORGANIZED HEALTH CARE EDUCATION/TRAINING PROGRAM

## 2024-10-29 LAB
FORWARD REASON: SPWHY: NORMAL
FORWARDED TO LAB: SPWHR: NORMAL
SPECIMEN SENT (2ND): SPWT2: NORMAL
SPECIMEN SENT (3RD): SPWT3: NORMAL
SPECIMEN SENT (4TH): SPWT4: NORMAL
SPECIMEN SENT: SPWT1: NORMAL

## 2025-04-24 ENCOUNTER — APPOINTMENT (OUTPATIENT)
Dept: URBAN - METROPOLITAN AREA CLINIC 4 | Facility: CLINIC | Age: 60
Setting detail: DERMATOLOGY
End: 2025-04-24

## 2025-04-24 DIAGNOSIS — Z86.007 PERSONAL HISTORY OF IN-SITU NEOPLASM OF SKIN: ICD-10-CM

## 2025-04-24 DIAGNOSIS — L57.0 ACTINIC KERATOSIS: ICD-10-CM

## 2025-04-24 PROCEDURE — ? LIQUID NITROGEN

## 2025-04-24 PROCEDURE — 17000 DESTRUCT PREMALG LESION: CPT

## 2025-04-24 PROCEDURE — ? COUNSELING

## 2025-04-24 PROCEDURE — 17003 DESTRUCT PREMALG LES 2-14: CPT

## 2025-04-24 ASSESSMENT — LOCATION SIMPLE DESCRIPTION DERM
LOCATION SIMPLE: LEFT HAND
LOCATION SIMPLE: NOSE
LOCATION SIMPLE: LEFT CHEEK
LOCATION SIMPLE: RIGHT CHEEK

## 2025-04-24 ASSESSMENT — LOCATION DETAILED DESCRIPTION DERM
LOCATION DETAILED: RIGHT LATERAL MALAR CHEEK
LOCATION DETAILED: NASAL DORSUM
LOCATION DETAILED: LEFT RADIAL DORSAL HAND
LOCATION DETAILED: LEFT SUPERIOR CENTRAL MALAR CHEEK

## 2025-04-24 ASSESSMENT — LOCATION ZONE DERM
LOCATION ZONE: HAND
LOCATION ZONE: FACE
LOCATION ZONE: NOSE

## 2025-04-24 NOTE — PROCEDURE: LIQUID NITROGEN
Show Aperture Variable?: Yes
Consent: The patient's consent was obtained including but not limited to risks of crusting, scabbing, blistering, scarring, darker or lighter pigmentary change, recurrence, incomplete removal and infection.
Aperture Size (Optional): C
Application Tool (Optional): Liquid Nitrogen Sprayer
Duration Of Freeze Thaw-Cycle (Seconds): 2
Post-Care Instructions: I reviewed with the patient in detail post-care instructions. Patient is to wear sunprotection, and avoid picking at any of the treated lesions. Pt may apply Vaseline to crusted or scabbing areas.
Render Note In Bullet Format When Appropriate: No
Number Of Freeze-Thaw Cycles: 2 freeze-thaw cycles
Detail Level: Simple

## 2025-06-27 ENCOUNTER — APPOINTMENT (OUTPATIENT)
Dept: URBAN - METROPOLITAN AREA CLINIC 4 | Facility: CLINIC | Age: 60
Setting detail: DERMATOLOGY
End: 2025-06-27

## 2025-06-27 DIAGNOSIS — L82.1 OTHER SEBORRHEIC KERATOSIS: ICD-10-CM

## 2025-06-27 DIAGNOSIS — D18.0 HEMANGIOMA: ICD-10-CM

## 2025-06-27 DIAGNOSIS — D22 MELANOCYTIC NEVI: ICD-10-CM

## 2025-06-27 DIAGNOSIS — L81.4 OTHER MELANIN HYPERPIGMENTATION: ICD-10-CM

## 2025-06-27 DIAGNOSIS — L57.0 ACTINIC KERATOSIS: ICD-10-CM

## 2025-06-27 DIAGNOSIS — Z71.89 OTHER SPECIFIED COUNSELING: ICD-10-CM

## 2025-06-27 PROBLEM — D22.5 MELANOCYTIC NEVI OF TRUNK: Status: ACTIVE | Noted: 2025-06-27

## 2025-06-27 PROBLEM — D18.01 HEMANGIOMA OF SKIN AND SUBCUTANEOUS TISSUE: Status: ACTIVE | Noted: 2025-06-27

## 2025-06-27 PROCEDURE — ? SUNSCREEN RECOMMENDATIONS

## 2025-06-27 PROCEDURE — ? COUNSELING

## 2025-06-27 PROCEDURE — ? LIQUID NITROGEN

## 2025-06-27 ASSESSMENT — LOCATION SIMPLE DESCRIPTION DERM
LOCATION SIMPLE: RIGHT HAND
LOCATION SIMPLE: LEFT SHOULDER
LOCATION SIMPLE: RIGHT SHOULDER
LOCATION SIMPLE: UPPER BACK

## 2025-06-27 ASSESSMENT — LOCATION DETAILED DESCRIPTION DERM
LOCATION DETAILED: INFERIOR THORACIC SPINE
LOCATION DETAILED: RIGHT POSTERIOR SHOULDER
LOCATION DETAILED: 3RD WEB SPACE RIGHT HAND
LOCATION DETAILED: LEFT POSTERIOR SHOULDER

## 2025-06-27 ASSESSMENT — LOCATION ZONE DERM
LOCATION ZONE: ARM
LOCATION ZONE: HAND
LOCATION ZONE: TRUNK

## 2025-06-27 NOTE — PROCEDURE: LIQUID NITROGEN
Render Note In Bullet Format When Appropriate: No
Number Of Freeze-Thaw Cycles: 1 freeze-thaw cycle
Detail Level: Detailed
Consent: The patient's consent was obtained including but not limited to risks of crusting, scabbing, blistering, scarring, darker or lighter pigmentary change, recurrence, incomplete removal and infection.
Show Aperture Variable?: Yes
Application Tool (Optional): Liquid Nitrogen Sprayer
Post-Care Instructions: I reviewed with the patient in detail post-care instructions. Patient is to wear sunprotection, and avoid picking at any of the treated lesions. Pt may apply Vaseline to crusted or scabbing areas.
Duration Of Freeze Thaw-Cycle (Seconds): 4